# Patient Record
Sex: FEMALE | NOT HISPANIC OR LATINO | Employment: FULL TIME | URBAN - METROPOLITAN AREA
[De-identification: names, ages, dates, MRNs, and addresses within clinical notes are randomized per-mention and may not be internally consistent; named-entity substitution may affect disease eponyms.]

---

## 2017-03-22 ENCOUNTER — TELEPHONE (OUTPATIENT)
Dept: OBSTETRICS AND GYNECOLOGY | Facility: CLINIC | Age: 37
End: 2017-03-22

## 2017-03-22 ENCOUNTER — PATIENT MESSAGE (OUTPATIENT)
Dept: OBSTETRICS AND GYNECOLOGY | Facility: CLINIC | Age: 37
End: 2017-03-22

## 2017-04-24 ENCOUNTER — OFFICE VISIT (OUTPATIENT)
Dept: OBSTETRICS AND GYNECOLOGY | Facility: CLINIC | Age: 37
End: 2017-04-24
Payer: COMMERCIAL

## 2017-04-24 VITALS
BODY MASS INDEX: 35.61 KG/M2 | HEIGHT: 64 IN | SYSTOLIC BLOOD PRESSURE: 108 MMHG | WEIGHT: 208.56 LBS | DIASTOLIC BLOOD PRESSURE: 72 MMHG

## 2017-04-24 DIAGNOSIS — Z30.41 ENCOUNTER FOR SURVEILLANCE OF CONTRACEPTIVE PILLS: ICD-10-CM

## 2017-04-24 DIAGNOSIS — R10.2 ACUTE PELVIC PAIN, FEMALE: ICD-10-CM

## 2017-04-24 PROCEDURE — 99999 PR PBB SHADOW E&M-EST. PATIENT-LVL II: CPT | Mod: PBBFAC,,, | Performed by: NURSE PRACTITIONER

## 2017-04-24 PROCEDURE — 88175 CYTOPATH C/V AUTO FLUID REDO: CPT

## 2017-04-24 PROCEDURE — 1160F RVW MEDS BY RX/DR IN RCRD: CPT | Mod: S$GLB,,, | Performed by: NURSE PRACTITIONER

## 2017-04-24 PROCEDURE — 99203 OFFICE O/P NEW LOW 30 MIN: CPT | Mod: S$GLB,,, | Performed by: NURSE PRACTITIONER

## 2017-04-24 RX ORDER — LEVONORGESTREL / ETHINYL ESTRADIOL AND ETHINYL ESTRADIOL 150-30(84)
1 KIT ORAL DAILY
Qty: 91 EACH | Refills: 0 | Status: SHIPPED | OUTPATIENT
Start: 2017-04-24 | End: 2017-07-17 | Stop reason: SDUPTHER

## 2017-04-24 NOTE — PROGRESS NOTES
"CC: Well woman exam    Micaela David is a 36 y.o. female  presents for well woman exam.  LMP: Patient's last menstrual period was 2016..  No issues, problems, or complaints. Patient states pap exam 2014 , normal. OCP cycles are 3 months related to OCP.      Past Medical History:   Diagnosis Date    Asthma      History reviewed. No pertinent surgical history.  Social History     Social History    Marital status: Single     Spouse name: N/A    Number of children: N/A    Years of education: N/A     Occupational History    Not on file.     Social History Main Topics    Smoking status: Current Every Day Smoker     Types: Cigarettes    Smokeless tobacco: Not on file      Comment: 3 per day     Alcohol use 0.0 oz/week     0 Standard drinks or equivalent per week      Comment: socially     Drug use: No    Sexual activity: Yes     Partners: Male     Birth control/ protection: OCP     Other Topics Concern    Not on file     Social History Narrative     Family History   Problem Relation Age of Onset    Colon cancer Maternal Grandfather     Ovarian cancer Maternal Aunt     Diabetes Mother     Hypertension Mother     Diabetes Father     Hypertension Father     Breast cancer Neg Hx      OB History      Para Term  AB TAB SAB Ectopic Multiple Living    4 2 2  2     2          /72  Ht 5' 4" (1.626 m)  Wt 94.6 kg (208 lb 8.9 oz)  LMP 2016  BMI 35.8 kg/m2      ROS:  GENERAL: Denies weight gain or weight loss. Feeling well overall.   SKIN: Denies rash or lesions.   HEAD: Denies head injury or headache.   NODES: Denies enlarged lymph nodes.   CHEST: Denies chest pain or shortness of breath.   CARDIOVASCULAR: Denies palpitations or left sided chest pain.   ABDOMEN: No abdominal pain, constipation, diarrhea, nausea, vomiting or rectal bleeding.   URINARY: No frequency, dysuria, hematuria, or burning on urination.  REPRODUCTIVE: See HPI.   BREASTS: The patient " performs breast self-examination and denies pain, lumps, or nipple discharge.   HEMATOLOGIC: No easy bruisability or excessive bleeding.   MUSCULOSKELETAL: Denies joint pain or swelling.   NEUROLOGIC: Denies syncope or weakness.   PSYCHIATRIC: Denies depression, anxiety or mood swings.    PHYSICAL EXAM:  APPEARANCE: Obese female, in no acute distress.  AFFECT: WNL, alert and oriented x 3  SKIN: No acne or hirsutism  NECK: Neck symmetric without masses or thyromegaly  NODES: No inguinal, cervical, axillary, or femoral lymph node enlargement  CHEST: Good respiratory effect  ABDOMEN: Soft.  No tenderness or masses.  No hepatosplenomegaly.  No hernias.  BREASTS: Symmetrical, no skin changes or visible lesions.  No palpable masses, nipple discharge bilaterally.  PELVIC: Normal external genitalia without lesions.  Normal hair distribution.  Adequate perineal body, normal urethral meatus.  Vagina moist and well rugated without lesions or discharge.  Cervix pink, without lesions, discharge or tenderness.  No significant cystocele or rectocele.  Bimanual exam shows uterus to be normal size, regular, mobile and nontender.  Adnexa without masses or tenderness.    EXTREMITIES: No edema.    1. Acute pelvic pain, female  L norgest/e.estradiol-e.estrad (AMETHIA) 0.15 mg-30 mcg (84)/10 mcg (7) 3MPk   2. Encounter for surveillance of contraceptive pills  Liquid-based pap smear, screening    L norgest/e.estradiol-e.estrad (AMETHIA) 0.15 mg-30 mcg (84)/10 mcg (7) 3MPk    PLAN:  OCP refill  Pap exam  Patient was counseled today on A.C.S. Pap guidelines and recommendations for yearly pelvic exams, mammograms and monthly self breast exams; to see her PCP for other health maintenance.

## 2017-04-24 NOTE — MR AVS SNAPSHOT
[facility]  [address] [facility]  [place] [address]  Phone: [phone]  Fax: [phone]                  Micaela David   2017 4:00 PM   Office Visit    Description:  Female : 1980   Provider:  Jackie Cheng NP   Department:  [facility]           Reason for Visit     Gynecologic Exam           Diagnoses this Visit        Comments    Acute pelvic pain, female         Encounter for surveillance of contraceptive pills                To Do List           Goals (5 Years of Data)     None       These Medications        Disp Refills Start End    L norgest/e.estradiol-e.estrad (AMETHIA) 0.15 mg-30 mcg (84)/10 mcg (7) 3MPk 91 each 0 2017     Take 1 tablet by mouth once daily. - Oral    Pharmacy: [facility] [address] [place] [address] [facility] [address] AT [facility] [address] & [place] [address] Ph #: [phone]         [facility]     [facility] Nurse Care Line -  Assistance  Unless otherwise directed by your provider, please contact [facility], our nurse care line that is available for  assistance.     Registered nurses in the [facility] provide: appointment scheduling, clinical advisement, health education, and other advisory services.  Call: [phone] (toll free)               Medications           Message regarding Medications     Verify the changes and/or additions to your medication regime listed below are the same as discussed with your clinician today.  If any of these changes or additions are incorrect, please notify your healthcare provider.        CHANGE how you are taking these medications     Start Taking Instead of    L norgest/e.estradiol-e.estrad (AMETHIA) 0.15 mg-30 mcg (84)/10 mcg (7) 3MPk AMETHIA 0.15 mg-30 mcg (84)/10 mcg (7) 3MPk    Dosage:  Take 1 tablet by mouth once daily. Dosage:  TAKE 1 TABLET BY MOUTH EVERY DAY    Reason for Change:  Reorder       STOP taking these medications      "methylPREDNISolone (MEDROL DOSEPACK) 4 mg tablet Take as directed           Verify that the below list of medications is an accurate representation of the medications you are currently taking.  If none reported, the list may be blank. If incorrect, please contact your healthcare provider. Carry this list with you in case of emergency.           Current Medications     L norgest/e.estradiol-e.estrad (AMETHIA) 0.15 mg-30 mcg (84)/10 mcg (7) 3MPk Take 1 tablet by mouth once daily.           Clinical Reference Information           Your Vitals Were     BP Height Weight Last Period BMI    108/72 5' 4" (1.626 m) 94.6 kg (208 lb 8.9 oz) 11/25/2016 35.8 kg/m2      Blood Pressure          Most Recent Value    BP  108/72      Allergies as of 4/24/2017     No Known Allergies      Immunizations Administered on Date of Encounter - 4/24/2017     None      Orders Placed During Today's Visit      Normal Orders This Visit    Liquid-based pap smear, screening       Smoking Cessation     If you would like to quit smoking:   You may be eligible for free services if you are a [place] resident and started smoking cigarettes before September 1, 1988.  Call the Smoking Cessation Trust ([facility]) toll free at [phone] [phone] or [phone] [phone].   Call -800-QUIT-NOW if you do not meet the above criteria.   Contact us via email: [email or web address]   View our website for more information: [email or web address]        Language Assistance Services     ATTENTION: Language assistance services are available, free of charge. Please call [phone].      ATENCIÓN: Si habla español, tiene a pool disposición servicios gratuitos de asistencia lingüística. Llame al [phone].     CHÚ Ý: N?u b?n nói Ti?ng Vi?t, có các d?ch v? h? tr? ngôn ng? mi?n phí dành cho b?n. G?i s? [phone].         [facility] complies with applicable Federal civil rights laws and does not discriminate on the basis of race, color, national origin, age, " disability, or sex.

## 2017-07-17 DIAGNOSIS — Z30.41 ENCOUNTER FOR SURVEILLANCE OF CONTRACEPTIVE PILLS: ICD-10-CM

## 2017-07-17 DIAGNOSIS — R10.2 ACUTE PELVIC PAIN, FEMALE: ICD-10-CM

## 2017-07-17 RX ORDER — ETHINYL ESTRADIOL AND LEVONORGESTREL 150-30(84)
KIT ORAL
Qty: 28 EACH | Refills: 3 | Status: SHIPPED | OUTPATIENT
Start: 2017-07-17 | End: 2018-02-15

## 2018-02-14 ENCOUNTER — PATIENT MESSAGE (OUTPATIENT)
Dept: OBSTETRICS AND GYNECOLOGY | Facility: CLINIC | Age: 38
End: 2018-02-14

## 2018-02-15 ENCOUNTER — LAB VISIT (OUTPATIENT)
Dept: LAB | Facility: HOSPITAL | Age: 38
End: 2018-02-15
Attending: OBSTETRICS & GYNECOLOGY
Payer: COMMERCIAL

## 2018-02-15 ENCOUNTER — OFFICE VISIT (OUTPATIENT)
Dept: OBSTETRICS AND GYNECOLOGY | Facility: CLINIC | Age: 38
End: 2018-02-15
Payer: COMMERCIAL

## 2018-02-15 VITALS
WEIGHT: 207.69 LBS | DIASTOLIC BLOOD PRESSURE: 78 MMHG | SYSTOLIC BLOOD PRESSURE: 122 MMHG | BODY MASS INDEX: 35.46 KG/M2 | HEIGHT: 64 IN

## 2018-02-15 DIAGNOSIS — N93.9 ABNORMAL UTERINE BLEEDING (AUB): ICD-10-CM

## 2018-02-15 DIAGNOSIS — N93.9 ABNORMAL UTERINE BLEEDING (AUB): Primary | ICD-10-CM

## 2018-02-15 DIAGNOSIS — Z11.3 SCREEN FOR STD (SEXUALLY TRANSMITTED DISEASE): ICD-10-CM

## 2018-02-15 LAB
BASOPHILS # BLD AUTO: 0.05 K/UL
BASOPHILS NFR BLD: 0.9 %
C TRACH DNA SPEC QL NAA+PROBE: NOT DETECTED
DIFFERENTIAL METHOD: NORMAL
EOSINOPHIL # BLD AUTO: 0.1 K/UL
EOSINOPHIL NFR BLD: 2.5 %
ERYTHROCYTE [DISTWIDTH] IN BLOOD BY AUTOMATED COUNT: 12.9 %
FSH SERPL-ACNC: 8.4 MIU/ML
HCT VFR BLD AUTO: 37.9 %
HGB BLD-MCNC: 12.7 G/DL
IMM GRANULOCYTES # BLD AUTO: 0.01 K/UL
IMM GRANULOCYTES NFR BLD AUTO: 0.2 %
LYMPHOCYTES # BLD AUTO: 2.2 K/UL
LYMPHOCYTES NFR BLD: 41.1 %
MCH RBC QN AUTO: 29.1 PG
MCHC RBC AUTO-ENTMCNC: 33.5 G/DL
MCV RBC AUTO: 87 FL
MONOCYTES # BLD AUTO: 0.3 K/UL
MONOCYTES NFR BLD: 6.3 %
N GONORRHOEA DNA SPEC QL NAA+PROBE: NOT DETECTED
NEUTROPHILS # BLD AUTO: 2.6 K/UL
NEUTROPHILS NFR BLD: 49 %
NRBC BLD-RTO: 0 /100 WBC
PLATELET # BLD AUTO: 336 K/UL
PMV BLD AUTO: 10.4 FL
RBC # BLD AUTO: 4.37 M/UL
TSH SERPL DL<=0.005 MIU/L-ACNC: 0.46 UIU/ML
WBC # BLD AUTO: 5.28 K/UL

## 2018-02-15 PROCEDURE — 99213 OFFICE O/P EST LOW 20 MIN: CPT | Mod: S$GLB,,, | Performed by: OBSTETRICS & GYNECOLOGY

## 2018-02-15 PROCEDURE — 84443 ASSAY THYROID STIM HORMONE: CPT

## 2018-02-15 PROCEDURE — 36415 COLL VENOUS BLD VENIPUNCTURE: CPT | Mod: PO

## 2018-02-15 PROCEDURE — 81025 URINE PREGNANCY TEST: CPT | Mod: S$GLB,,, | Performed by: OBSTETRICS & GYNECOLOGY

## 2018-02-15 PROCEDURE — 83001 ASSAY OF GONADOTROPIN (FSH): CPT

## 2018-02-15 PROCEDURE — 85025 COMPLETE CBC W/AUTO DIFF WBC: CPT

## 2018-02-15 PROCEDURE — 99999 PR PBB SHADOW E&M-EST. PATIENT-LVL II: CPT | Mod: PBBFAC,,, | Performed by: OBSTETRICS & GYNECOLOGY

## 2018-02-15 PROCEDURE — 3008F BODY MASS INDEX DOCD: CPT | Mod: S$GLB,,, | Performed by: OBSTETRICS & GYNECOLOGY

## 2018-02-15 PROCEDURE — 87491 CHLMYD TRACH DNA AMP PROBE: CPT

## 2018-02-15 RX ORDER — NORGESTIMATE AND ETHINYL ESTRADIOL 7DAYSX3 LO
1 KIT ORAL DAILY
Qty: 30 TABLET | Refills: 3 | Status: SHIPPED | OUTPATIENT
Start: 2018-02-15 | End: 2018-05-08

## 2018-02-15 RX ORDER — MEDROXYPROGESTERONE ACETATE 10 MG/1
10 TABLET ORAL DAILY
Qty: 10 TABLET | Refills: 0 | Status: SHIPPED | OUTPATIENT
Start: 2018-02-15 | End: 2018-05-08

## 2018-02-15 NOTE — PROGRESS NOTES
Subjective:       Patient ID: Micaela David is a 37 y.o. female.    Chief Complaint:  Vaginal Bleeding      History of Present Illness  HPI  Dysfunctional Uterine Bleeding  Patient complains of irregular menses. Pt was using continuous OCP and discontinued use in 2017 to try for pregnancy with .  Last normal menses occurred in 2017.  Pt then skipped menses for the next 5 months.  Menses then restarted on 18 and was heavier than usual.  She is still bleeding today but states that flow is much lighter now. Dysmenorrhea:mild, occurring first 1-2 days of flow. Cyclic symptoms include: none. Current contraception: none. History of infertility: no. History of abnormal Pap smear: no.  Last pap NILM in 2017.       GYN & OB History  Patient's last menstrual period was 2018 (exact date).   Date of Last Pap: 2017    OB History    Para Term  AB Living   4 2 2   2 2   SAB TAB Ectopic Multiple Live Births           2      # Outcome Date GA Lbr Elbert/2nd Weight Sex Delivery Anes PTL Lv   4 AB  4w0d          3 Term    3.515 kg (7 lb 12 oz) F CS-Unspec   JEIMY   2 Term    3.572 kg (7 lb 14 oz) F CS-Unspec   JEIMY   1 AB                   Review of Systems  Review of Systems   Constitutional: Negative for activity change, appetite change, fatigue, fever and unexpected weight change.   Respiratory: Negative for shortness of breath.    Cardiovascular: Negative for chest pain, palpitations and leg swelling.   Gastrointestinal: Negative for abdominal pain, bloating, blood in stool, constipation, diarrhea, nausea and vomiting.   Genitourinary: Positive for menorrhagia, menstrual problem and vaginal bleeding. Negative for dyspareunia, dysuria, flank pain, frequency, genital sores, hematuria, pelvic pain, vaginal discharge, vaginal pain, dysmenorrhea, urinary incontinence and vaginal odor.   Musculoskeletal: Negative for back pain.   Neurological: Negative for syncope and headaches.            Objective:    Physical Exam:   Constitutional: She is oriented to person, place, and time. She appears well-developed and well-nourished. No distress.       Cardiovascular: Normal rate, regular rhythm and normal heart sounds.     Pulmonary/Chest: Effort normal and breath sounds normal.        Abdominal: Soft. Bowel sounds are normal. She exhibits no distension. There is no tenderness.     Genitourinary: Uterus normal. Pelvic exam was performed with patient supine. There is no rash, tenderness, lesion or injury on the right labia. There is no rash, tenderness, lesion or injury on the left labia. Uterus is not deviated, not enlarged and not tender. Cervix is normal. Right adnexum displays no mass, no tenderness and no fullness. Left adnexum displays no mass, no tenderness and no fullness. There is bleeding (minimal amount of old blood in vault; no active bleeding) in the vagina. No erythema or tenderness in the vagina. No foreign body in the vagina. No signs of injury around the vagina. No vaginal discharge found. Cervix exhibits no motion tenderness, no discharge and no friability.   Genitourinary Comments: UPT today Negative           Musculoskeletal: Normal range of motion and moves all extremeties. She exhibits no edema or tenderness.       Neurological: She is alert and oriented to person, place, and time.    Skin: Skin is warm and dry.    Psychiatric: She has a normal mood and affect. Her behavior is normal. Thought content normal.          Assessment:        1. Abnormal uterine bleeding (AUB)    2. Screen for STD (sexually transmitted disease)              Plan:      Abnormal uterine bleeding (AUB)  -     CBC auto differential; Future; Expected date: 02/15/2018  -     TSH; Future; Expected date: 02/15/2018  -     Follicle stimulating hormone; Future; Expected date: 02/15/2018  -     POCT urine pregnancy  -     medroxyPROGESTERone (PROVERA) 10 MG tablet; Take 1 tablet (10 mg total) by mouth once daily.   Dispense: 10 tablet; Refill: 0  -     norgestimate-ethinyl estradiol (ORTHO TRI-CYCLEN LO) 0.18/0.215/0.25 mg-25 mcg tablet; Take 1 tablet by mouth once daily.  Dispense: 30 tablet; Refill: 3  -     Pt was counseled on treatment options, including associated risks and benefits of each.  Pt voiced understanding and desires to proceed with course of Provera followed by OCP x 3-6 months.  Medication dosing, side-effects, risks, benefits, and alternatives were discussed.  Medical history was reviewed and pt is a candidate for OCP use.    Screen for STD (sexually transmitted disease)  -     C. trachomatis/N. gonorrhoeae by AMP DNA Cervix      Follow-up if symptoms worsen or fail to improve.

## 2018-05-08 ENCOUNTER — OFFICE VISIT (OUTPATIENT)
Dept: OBSTETRICS AND GYNECOLOGY | Facility: CLINIC | Age: 38
End: 2018-05-08
Payer: COMMERCIAL

## 2018-05-08 VITALS
BODY MASS INDEX: 35.46 KG/M2 | SYSTOLIC BLOOD PRESSURE: 116 MMHG | DIASTOLIC BLOOD PRESSURE: 74 MMHG | WEIGHT: 207.69 LBS | HEIGHT: 64 IN

## 2018-05-08 DIAGNOSIS — Z32.01 POSITIVE PREGNANCY TEST: Primary | ICD-10-CM

## 2018-05-08 DIAGNOSIS — O34.219 HISTORY OF CESAREAN DELIVERY, CURRENTLY PREGNANT: ICD-10-CM

## 2018-05-08 DIAGNOSIS — O09.521 AMA (ADVANCED MATERNAL AGE) MULTIGRAVIDA 35+, FIRST TRIMESTER: ICD-10-CM

## 2018-05-08 PROCEDURE — 87086 URINE CULTURE/COLONY COUNT: CPT

## 2018-05-08 PROCEDURE — 99213 OFFICE O/P EST LOW 20 MIN: CPT | Mod: S$GLB,,, | Performed by: OBSTETRICS & GYNECOLOGY

## 2018-05-08 PROCEDURE — 81025 URINE PREGNANCY TEST: CPT | Mod: S$GLB,,, | Performed by: OBSTETRICS & GYNECOLOGY

## 2018-05-08 PROCEDURE — 99999 PR PBB SHADOW E&M-EST. PATIENT-LVL II: CPT | Mod: PBBFAC,,, | Performed by: OBSTETRICS & GYNECOLOGY

## 2018-05-08 NOTE — PROGRESS NOTES
CHIEF COMPLAINT:   Patient presents with      Possible Pregnancy        HISTORY OF PRESENT ILLNESS  Micaela David 37 y.o.  presents for pregnancy risk assessment.   The patient has no complaints today.  No nausea or vomiting. No bleeding or pain.  Pregnancy was planned and is desired.   is supportive of pregnancy and was present for today's visit.  Sister was also present.  Lives at home with , sister, and 2 children.  Has pets at home: birds and fish.  Works in a [employer]ing.  Denies domestic abuse.  Denies chemical/pesticide/radiation exposure.  OB history:  1.  C/S at term (fetal distress) with no complications  2.  C/S at term (scheduled repeat) with no complications  3.  SAB  4.  SAB    LMP: Patient's last menstrual period was 03/10/2018.  EDC: Estimated Date of Delivery: 12/15/18  EGA: 8w3d       Health Maintenance   Topic Date Due    Lipid Panel  1980    TETANUS VACCINE  2000    Pneumococcal PPSV23 (Medium Risk) (1) 2000    Influenza Vaccine  2018    Pap Smear with HPV Cotest  2020       Past Medical History:   Diagnosis Date    Asthma        History reviewed. No pertinent surgical history.    Family History   Problem Relation Age of Onset    Colon cancer Maternal Grandfather     Ovarian cancer Maternal Aunt     Diabetes Mother     Hypertension Mother     Diabetes Father     Hypertension Father     Breast cancer Neg Hx        Social History     Social History    Marital status: Single     Spouse name: N/A    Number of children: N/A    Years of education: N/A     Social History Main Topics    Smoking status: Light Tobacco Smoker     Types: Vaping with nicotine    Smokeless tobacco: Never Used      Comment: 3 per day     Alcohol use 0.0 oz/week      Comment: socially     Drug use: No    Sexual activity: Yes     Partners: Male     Birth control/ protection: None     Other Topics Concern    None     Social History  Narrative    None       Current Outpatient Prescriptions   Medication Sig Dispense Refill    -IRON-FOLATE 1-DSS-DHA ORAL Take by mouth.       No current facility-administered medications for this visit.        Review of patient's allergies indicates:  No Known Allergies      PHYSICAL EXAM   Vitals:    05/08/18 1506   BP: 116/74        PAIN SCALE: 0/10 None    PHYSICAL EXAM    ROS:  GENERAL: No fever, chills, fatigability or weight loss.  CV: Denies chest pain  PULM: Denies shortness of breath or wheezing.  ABDOMEN: Appetite fine. No weight loss. Denies diarrhea, abdominal pain, hematemesis or blood in stool.  URINARY: No flank pain, dysuria or hematuria.  REPRODUCTIVE: No abnormal vaginal bleeding.       PE:   APPEARANCE: Well nourished, well developed, in no acute distress  CHEST: Clear to auscultation bilaterally  CV: Regular rate and rhythm  BREASTS: Symmetrical, no skin changes or visible lesions. No palpable masses, nipple discharge or adenopathy bilaterally.  ABDOMEN: Soft. No tenderness or masses. No hepatosplenomegaly. No hernias  PELVIC: Deferred    UPT +    A/P:  Positive pregnancy test  -      Patient was counseled today on A.C.S. Pap guidelines and recommendations for yearly pelvic exams, mammograms and monthly self breast exams; to see her PCP for other health maintenance and pregnancy.   -      Patient's medications and medical history reviewed with patient and implications in pregnancy.   -      Pregnancy course discussed and 'AtoZ' book given. Patient was counseled on proper weight gain based on the [place] [employer]'s recommendations based on her pre-pregnancy weight. Discussed foods to avoid in pregnancy (i.e. sushi, fish that are high in mercury, deli meat, and unpasteurized cheeses). Discussed prenatal vitamin options (i.e. stool softener, DHA). Discussed potential medical problems in pregnancy.  -     Discussed risk of Toxoplasmosis transmission from pets and reviewed risk reduction  techniques.  -     Pt was counseled on exercise in pregnancy and weight gain recommendations.  -     Pt was counseled on travel recommendations and on risks of Zika virus exposure.  Current CDC Zika advisories and prevention techniques were reviewed with pt.  Pt denies any recent international travel and does not plan travel during pregnancy.  Pt reports that partner does not plan travel either.  -     Oriented to practice including CNM collaboration.   -     Follow-up initial OB, with labs and u/s.     AMA (advanced maternal age) multigravida 35+, first trimester  -     CBC auto differential; Future; Expected date: 2018  -     Hepatitis panel, acute; Future; Expected date: 2018  -     HIV-1 and HIV-2 antibodies; Future; Expected date: 2018  -     POCT urine pregnancy  -     RPR; Future; Expected date: 2018  -     Rubella antibody, IgG; Future; Expected date: 2018  -     Type & Screen; Future; Expected date: 2018  -     Hemoglobin A1c; Future; Expected date: 2018  -     Urine culture  -     US OB/GYN Procedure (Viewpoint); Future  -     Discused increased risks with AMA status.  -     Chromosomal abnormality risk discussed and available testing offered.     History of  delivery, currently pregnant  -     Pt was counseled on delivery options ( vs Repeat C/S) along with associated risks and benefits.  In particular, risk of uterine rupture with  attempt was reviewed.  Group policy against elective induction or use of pitocin/cytotec in this scenario was discussed.  Pt voiced understanding and desires to proceed with repeat C/S.        Follow-up in about 1 week (around 5/15/2018) for Initial OB with CNM.

## 2018-05-08 NOTE — PROGRESS NOTES
Subjective:       Patient ID: Micaela David is a 37 y.o. female.    Chief Complaint:  No chief complaint on file.      History of Present Illness  HPI  {OBG HPI BLOCKS:58232}    GYN & OB History  No LMP recorded.   Date of Last Pap: 2017    OB History    Para Term  AB Living   4 2 2   2 2   SAB TAB Ectopic Multiple Live Births           2      # Outcome Date GA Lbr Elbert/2nd Weight Sex Delivery Anes PTL Lv   4 AB  4w0d          3 Term    3.515 kg (7 lb 12 oz) F CS-Unspec   JEIMY   2 Term    3.572 kg (7 lb 14 oz) F CS-Unspec   JEIMY   1 AB                   Review of Systems  Review of Systems        Objective:    Physical Exam       Assessment:        1. Abnormal uterine bleeding (AUB)              Plan:      Abnormal uterine bleeding (AUB)      Follow-up in about 6 months (around 2018).

## 2018-05-10 LAB — BACTERIA UR CULT: NORMAL

## 2018-05-22 ENCOUNTER — PROCEDURE VISIT (OUTPATIENT)
Dept: OBSTETRICS AND GYNECOLOGY | Facility: CLINIC | Age: 38
End: 2018-05-22
Payer: COMMERCIAL

## 2018-05-22 ENCOUNTER — INITIAL PRENATAL (OUTPATIENT)
Dept: OBSTETRICS AND GYNECOLOGY | Facility: CLINIC | Age: 38
End: 2018-05-22
Payer: COMMERCIAL

## 2018-05-22 VITALS — BODY MASS INDEX: 35.19 KG/M2 | SYSTOLIC BLOOD PRESSURE: 108 MMHG | WEIGHT: 205 LBS | DIASTOLIC BLOOD PRESSURE: 68 MMHG

## 2018-05-22 DIAGNOSIS — Z98.891 PREVIOUS CESAREAN SECTION: ICD-10-CM

## 2018-05-22 DIAGNOSIS — Z34.91 NORMAL PREGNANCY IN FIRST TRIMESTER: Primary | ICD-10-CM

## 2018-05-22 DIAGNOSIS — O09.521 AMA (ADVANCED MATERNAL AGE) MULTIGRAVIDA 35+, FIRST TRIMESTER: ICD-10-CM

## 2018-05-22 PROCEDURE — 76817 TRANSVAGINAL US OBSTETRIC: CPT | Mod: S$GLB,,, | Performed by: OBSTETRICS & GYNECOLOGY

## 2018-05-22 PROCEDURE — 99999 PR PBB SHADOW E&M-EST. PATIENT-LVL II: CPT | Mod: PBBFAC,,, | Performed by: ADVANCED PRACTICE MIDWIFE

## 2018-05-22 PROCEDURE — 0500F INITIAL PRENATAL CARE VISIT: CPT | Mod: S$GLB,,, | Performed by: ADVANCED PRACTICE MIDWIFE

## 2018-05-22 RX ORDER — PROMETHAZINE HYDROCHLORIDE 25 MG/1
25 TABLET ORAL EVERY 4 HOURS
Qty: 30 TABLET | Refills: 1 | Status: SHIPPED | OUTPATIENT
Start: 2018-05-22 | End: 2018-07-26

## 2018-05-22 NOTE — PROGRESS NOTES
NOB s=d  Prior c section x2 will request records when patient provides doctors or hospital names  Weight goal 25 pounds set  zika precautions  Declines genetic testing  RX prenatal vitamins, phenergan

## 2018-06-18 ENCOUNTER — LAB VISIT (OUTPATIENT)
Dept: LAB | Facility: HOSPITAL | Age: 38
End: 2018-06-18
Attending: OBSTETRICS & GYNECOLOGY
Payer: COMMERCIAL

## 2018-06-18 ENCOUNTER — ROUTINE PRENATAL (OUTPATIENT)
Dept: OBSTETRICS AND GYNECOLOGY | Facility: CLINIC | Age: 38
End: 2018-06-18
Payer: COMMERCIAL

## 2018-06-18 VITALS
SYSTOLIC BLOOD PRESSURE: 112 MMHG | BODY MASS INDEX: 36.23 KG/M2 | WEIGHT: 211.06 LBS | DIASTOLIC BLOOD PRESSURE: 70 MMHG

## 2018-06-18 DIAGNOSIS — O09.521 AMA (ADVANCED MATERNAL AGE) MULTIGRAVIDA 35+, FIRST TRIMESTER: Primary | ICD-10-CM

## 2018-06-18 DIAGNOSIS — O09.521 AMA (ADVANCED MATERNAL AGE) MULTIGRAVIDA 35+, FIRST TRIMESTER: ICD-10-CM

## 2018-06-18 DIAGNOSIS — O34.219 HISTORY OF CESAREAN DELIVERY, CURRENTLY PREGNANT: ICD-10-CM

## 2018-06-18 LAB
BASOPHILS # BLD AUTO: 0.04 K/UL
BASOPHILS NFR BLD: 0.5 %
DIFFERENTIAL METHOD: NORMAL
EOSINOPHIL # BLD AUTO: 0.2 K/UL
EOSINOPHIL NFR BLD: 2.8 %
ERYTHROCYTE [DISTWIDTH] IN BLOOD BY AUTOMATED COUNT: 13.7 %
ESTIMATED AVG GLUCOSE: 111 MG/DL
HBA1C MFR BLD HPLC: 5.5 %
HCT VFR BLD AUTO: 39.1 %
HGB BLD-MCNC: 13 G/DL
IMM GRANULOCYTES # BLD AUTO: 0.02 K/UL
IMM GRANULOCYTES NFR BLD AUTO: 0.3 %
LYMPHOCYTES # BLD AUTO: 1.7 K/UL
LYMPHOCYTES NFR BLD: 21.1 %
MCH RBC QN AUTO: 28.6 PG
MCHC RBC AUTO-ENTMCNC: 33.2 G/DL
MCV RBC AUTO: 86 FL
MONOCYTES # BLD AUTO: 0.5 K/UL
MONOCYTES NFR BLD: 5.6 %
NEUTROPHILS # BLD AUTO: 5.6 K/UL
NEUTROPHILS NFR BLD: 69.7 %
NRBC BLD-RTO: 0 /100 WBC
PLATELET # BLD AUTO: 319 K/UL
PMV BLD AUTO: 10.8 FL
RBC # BLD AUTO: 4.54 M/UL
WBC # BLD AUTO: 7.97 K/UL

## 2018-06-18 PROCEDURE — 0502F SUBSEQUENT PRENATAL CARE: CPT | Mod: S$GLB,,, | Performed by: ADVANCED PRACTICE MIDWIFE

## 2018-06-18 PROCEDURE — 36415 COLL VENOUS BLD VENIPUNCTURE: CPT | Mod: PO

## 2018-06-18 PROCEDURE — 85025 COMPLETE CBC W/AUTO DIFF WBC: CPT

## 2018-06-18 PROCEDURE — 86592 SYPHILIS TEST NON-TREP QUAL: CPT

## 2018-06-18 PROCEDURE — 83036 HEMOGLOBIN GLYCOSYLATED A1C: CPT

## 2018-06-18 PROCEDURE — 99999 PR PBB SHADOW E&M-EST. PATIENT-LVL II: CPT | Mod: PBBFAC,,, | Performed by: ADVANCED PRACTICE MIDWIFE

## 2018-06-18 PROCEDURE — 87491 CHLMYD TRACH DNA AMP PROBE: CPT

## 2018-06-18 PROCEDURE — 80074 ACUTE HEPATITIS PANEL: CPT

## 2018-06-18 PROCEDURE — 86703 HIV-1/HIV-2 1 RESULT ANTBDY: CPT

## 2018-06-18 PROCEDURE — 86762 RUBELLA ANTIBODY: CPT

## 2018-06-18 PROCEDURE — 86850 RBC ANTIBODY SCREEN: CPT

## 2018-06-18 NOTE — PROGRESS NOTES
Doing well, no genetic screening. Discussed hx of c/s, desires repeat declines TOLAC. Labs today, culture through urine. +FHTs pt was excited. al

## 2018-06-19 LAB
ABO + RH BLD: NORMAL
BLD GP AB SCN CELLS X3 SERPL QL: NORMAL
C TRACH DNA SPEC QL NAA+PROBE: NOT DETECTED
HAV IGM SERPL QL IA: NEGATIVE
HBV CORE IGM SERPL QL IA: NEGATIVE
HBV SURFACE AG SERPL QL IA: NEGATIVE
HCV AB SERPL QL IA: NEGATIVE
HIV 1+2 AB+HIV1 P24 AG SERPL QL IA: NEGATIVE
N GONORRHOEA DNA SPEC QL NAA+PROBE: NOT DETECTED
RPR SER QL: NORMAL
RUBV IGG SER-ACNC: 33.4 IU/ML
RUBV IGG SER-IMP: REACTIVE

## 2018-07-26 ENCOUNTER — ROUTINE PRENATAL (OUTPATIENT)
Dept: OBSTETRICS AND GYNECOLOGY | Facility: CLINIC | Age: 38
End: 2018-07-26
Payer: COMMERCIAL

## 2018-07-26 VITALS
SYSTOLIC BLOOD PRESSURE: 102 MMHG | BODY MASS INDEX: 36.18 KG/M2 | DIASTOLIC BLOOD PRESSURE: 68 MMHG | WEIGHT: 210.75 LBS

## 2018-07-26 DIAGNOSIS — Z36.3 ENCOUNTER FOR ROUTINE SCREENING FOR MALFORMATION USING ULTRASONICS: ICD-10-CM

## 2018-07-26 DIAGNOSIS — Z34.82 ENCOUNTER FOR SUPERVISION OF OTHER NORMAL PREGNANCY IN SECOND TRIMESTER: Primary | ICD-10-CM

## 2018-07-26 PROCEDURE — 99999 PR PBB SHADOW E&M-EST. PATIENT-LVL II: CPT | Mod: PBBFAC,,, | Performed by: ADVANCED PRACTICE MIDWIFE

## 2018-07-26 PROCEDURE — 0502F SUBSEQUENT PRENATAL CARE: CPT | Mod: S$GLB,,, | Performed by: ADVANCED PRACTICE MIDWIFE

## 2018-07-26 NOTE — PROGRESS NOTES
Doing well  Disappointed, thought anatomy u/s was today. Explained 20 weeks  Discussed quad screen, declines    Coffective counseling sheet Fall In Love discussed with mother. Reinforced immediate skin to skin, the magic first hour, importance of the first feeding and delaying routine procedures. Encouraged mother to download Coffective mobile willie if she has not already done so. Mother verbalizes understanding.

## 2018-08-23 ENCOUNTER — PATIENT MESSAGE (OUTPATIENT)
Dept: OBSTETRICS AND GYNECOLOGY | Facility: CLINIC | Age: 38
End: 2018-08-23

## 2018-08-23 ENCOUNTER — PROCEDURE VISIT (OUTPATIENT)
Dept: OBSTETRICS AND GYNECOLOGY | Facility: CLINIC | Age: 38
End: 2018-08-23
Payer: COMMERCIAL

## 2018-08-23 ENCOUNTER — ROUTINE PRENATAL (OUTPATIENT)
Dept: OBSTETRICS AND GYNECOLOGY | Facility: CLINIC | Age: 38
End: 2018-08-23
Payer: COMMERCIAL

## 2018-08-23 VITALS — WEIGHT: 210.31 LBS | SYSTOLIC BLOOD PRESSURE: 134 MMHG | BODY MASS INDEX: 36.1 KG/M2 | DIASTOLIC BLOOD PRESSURE: 68 MMHG

## 2018-08-23 DIAGNOSIS — Z34.82 ENCOUNTER FOR SUPERVISION OF OTHER NORMAL PREGNANCY IN SECOND TRIMESTER: Primary | ICD-10-CM

## 2018-08-23 DIAGNOSIS — Z36.3 ENCOUNTER FOR ROUTINE SCREENING FOR MALFORMATION USING ULTRASONICS: ICD-10-CM

## 2018-08-23 PROCEDURE — 99999 PR PBB SHADOW E&M-EST. PATIENT-LVL II: CPT | Mod: PBBFAC,,, | Performed by: ADVANCED PRACTICE MIDWIFE

## 2018-08-23 PROCEDURE — 76805 OB US >/= 14 WKS SNGL FETUS: CPT | Mod: S$GLB,,, | Performed by: OBSTETRICS & GYNECOLOGY

## 2018-08-23 PROCEDURE — 0502F SUBSEQUENT PRENATAL CARE: CPT | Mod: S$GLB,,, | Performed by: ADVANCED PRACTICE MIDWIFE

## 2018-08-23 NOTE — PROGRESS NOTES
Doing well  Anatomy scan today. Suboptimal spine and heart. Repeat next visit  Questions answered    Coffective counseling sheet Learn Your Baby discussed with mother. Instructed regarding feeding cues and methods to calm baby. Encouraged mother to download Coffective mobile willie if she has not already done so.  Mother verbalized understanding.

## 2018-09-24 ENCOUNTER — TELEPHONE (OUTPATIENT)
Dept: OBSTETRICS AND GYNECOLOGY | Facility: CLINIC | Age: 38
End: 2018-09-24

## 2018-09-24 ENCOUNTER — PATIENT MESSAGE (OUTPATIENT)
Dept: OBSTETRICS AND GYNECOLOGY | Facility: CLINIC | Age: 38
End: 2018-09-24

## 2018-09-24 NOTE — TELEPHONE ENCOUNTER
Appt r/s for 10/01/18 at 11am at [facility], per pt request.  Pt voiced understanding.  NICKO CHRISTIEN

## 2018-09-24 NOTE — TELEPHONE ENCOUNTER
----- Message from Adri Barraza sent at 9/24/2018  7:50 AM CDT -----  Contact: Patient  Patient needs to reschedule her appts today, please call her back at [phone]. Thank you

## 2018-10-01 ENCOUNTER — ROUTINE PRENATAL (OUTPATIENT)
Dept: OBSTETRICS AND GYNECOLOGY | Facility: CLINIC | Age: 38
End: 2018-10-01
Payer: COMMERCIAL

## 2018-10-01 ENCOUNTER — PROCEDURE VISIT (OUTPATIENT)
Dept: OBSTETRICS AND GYNECOLOGY | Facility: CLINIC | Age: 38
End: 2018-10-01
Payer: COMMERCIAL

## 2018-10-01 VITALS — SYSTOLIC BLOOD PRESSURE: 120 MMHG | WEIGHT: 214.5 LBS | BODY MASS INDEX: 36.82 KG/M2 | DIASTOLIC BLOOD PRESSURE: 70 MMHG

## 2018-10-01 DIAGNOSIS — Z3A.28 28 WEEKS GESTATION OF PREGNANCY: Primary | ICD-10-CM

## 2018-10-01 PROCEDURE — 0502F SUBSEQUENT PRENATAL CARE: CPT | Mod: S$GLB,,, | Performed by: ADVANCED PRACTICE MIDWIFE

## 2018-10-01 PROCEDURE — 76805 OB US >/= 14 WKS SNGL FETUS: CPT | Mod: S$GLB,,, | Performed by: OBSTETRICS & GYNECOLOGY

## 2018-10-01 PROCEDURE — 99999 PR PBB SHADOW E&M-EST. PATIENT-LVL I: CPT | Mod: PBBFAC,,, | Performed by: ADVANCED PRACTICE MIDWIFE

## 2018-10-01 NOTE — PROGRESS NOTES
Doing well  28 week labs next visit  Anatomy scan complete  PTL talk    Coffective counseling sheet Fall In Love discussed with mother. Reinforced immediate skin to skin, the magic first hour, importance of the first feeding and delaying routine procedures. Encouraged mother to download Coffective mobile willie if she has not already done so. Mother verbalizes understanding.

## 2018-10-22 ENCOUNTER — LAB VISIT (OUTPATIENT)
Dept: LAB | Facility: HOSPITAL | Age: 38
End: 2018-10-22
Attending: ADVANCED PRACTICE MIDWIFE
Payer: COMMERCIAL

## 2018-10-22 ENCOUNTER — ROUTINE PRENATAL (OUTPATIENT)
Dept: OBSTETRICS AND GYNECOLOGY | Facility: CLINIC | Age: 38
End: 2018-10-22
Payer: COMMERCIAL

## 2018-10-22 VITALS
SYSTOLIC BLOOD PRESSURE: 110 MMHG | BODY MASS INDEX: 38.33 KG/M2 | DIASTOLIC BLOOD PRESSURE: 60 MMHG | WEIGHT: 223.31 LBS

## 2018-10-22 DIAGNOSIS — Z3A.28 28 WEEKS GESTATION OF PREGNANCY: ICD-10-CM

## 2018-10-22 DIAGNOSIS — Z34.83 ENCOUNTER FOR SUPERVISION OF OTHER NORMAL PREGNANCY IN THIRD TRIMESTER: Primary | ICD-10-CM

## 2018-10-22 LAB
BASOPHILS # BLD AUTO: 0.04 K/UL
BASOPHILS NFR BLD: 0.4 %
DIFFERENTIAL METHOD: ABNORMAL
EOSINOPHIL # BLD AUTO: 0.2 K/UL
EOSINOPHIL NFR BLD: 1.9 %
ERYTHROCYTE [DISTWIDTH] IN BLOOD BY AUTOMATED COUNT: 13.4 %
GLUCOSE SERPL-MCNC: 167 MG/DL
HCT VFR BLD AUTO: 35.5 %
HGB BLD-MCNC: 11.7 G/DL
IMM GRANULOCYTES # BLD AUTO: 0.04 K/UL
IMM GRANULOCYTES NFR BLD AUTO: 0.4 %
LYMPHOCYTES # BLD AUTO: 1.4 K/UL
LYMPHOCYTES NFR BLD: 15.1 %
MCH RBC QN AUTO: 28.5 PG
MCHC RBC AUTO-ENTMCNC: 33 G/DL
MCV RBC AUTO: 87 FL
MONOCYTES # BLD AUTO: 0.5 K/UL
MONOCYTES NFR BLD: 5.8 %
NEUTROPHILS # BLD AUTO: 7 K/UL
NEUTROPHILS NFR BLD: 76.4 %
NRBC BLD-RTO: 0 /100 WBC
PLATELET # BLD AUTO: 267 K/UL
PMV BLD AUTO: 11.4 FL
RBC # BLD AUTO: 4.1 M/UL
WBC # BLD AUTO: 9.16 K/UL

## 2018-10-22 PROCEDURE — 36415 COLL VENOUS BLD VENIPUNCTURE: CPT | Mod: PO

## 2018-10-22 PROCEDURE — 82950 GLUCOSE TEST: CPT

## 2018-10-22 PROCEDURE — 99999 PR PBB SHADOW E&M-EST. PATIENT-LVL II: CPT | Mod: PBBFAC,,, | Performed by: ADVANCED PRACTICE MIDWIFE

## 2018-10-22 PROCEDURE — 0502F SUBSEQUENT PRENATAL CARE: CPT | Mod: S$GLB,,, | Performed by: ADVANCED PRACTICE MIDWIFE

## 2018-10-22 PROCEDURE — 86703 HIV-1/HIV-2 1 RESULT ANTBDY: CPT

## 2018-10-22 PROCEDURE — 90471 IMMUNIZATION ADMIN: CPT | Mod: S$GLB,,, | Performed by: OBSTETRICS & GYNECOLOGY

## 2018-10-22 PROCEDURE — 90686 IIV4 VACC NO PRSV 0.5 ML IM: CPT | Mod: S$GLB,,, | Performed by: OBSTETRICS & GYNECOLOGY

## 2018-10-22 PROCEDURE — 85025 COMPLETE CBC W/AUTO DIFF WBC: CPT

## 2018-10-22 PROCEDURE — 86592 SYPHILIS TEST NON-TREP QUAL: CPT

## 2018-10-22 NOTE — PROGRESS NOTES
Doing well  28 week labs today  Flu shot given  TDAP next visit  PTL talk    Coffective counseling sheet Keep Baby Close discussed with mother. Reinforced rooming in practices, continued skin to skin, and quiet hours as requested by mother.  Encouraged mother to download Coffective mobile willie if she has not already done so. Mother verbalizes understanding.

## 2018-10-23 ENCOUNTER — PATIENT MESSAGE (OUTPATIENT)
Dept: OBSTETRICS AND GYNECOLOGY | Facility: CLINIC | Age: 38
End: 2018-10-23

## 2018-10-23 ENCOUNTER — TELEPHONE (OUTPATIENT)
Dept: OBSTETRICS AND GYNECOLOGY | Facility: HOSPITAL | Age: 38
End: 2018-10-23

## 2018-10-23 DIAGNOSIS — R73.09 ELEVATED GLUCOSE: Primary | ICD-10-CM

## 2018-10-23 LAB
HIV 1+2 AB+HIV1 P24 AG SERPL QL IA: NEGATIVE
RPR SER QL: NORMAL

## 2018-11-14 ENCOUNTER — ROUTINE PRENATAL (OUTPATIENT)
Dept: OBSTETRICS AND GYNECOLOGY | Facility: CLINIC | Age: 38
End: 2018-11-14
Payer: COMMERCIAL

## 2018-11-14 ENCOUNTER — LAB VISIT (OUTPATIENT)
Dept: LAB | Facility: HOSPITAL | Age: 38
End: 2018-11-14
Attending: ADVANCED PRACTICE MIDWIFE
Payer: COMMERCIAL

## 2018-11-14 VITALS — WEIGHT: 224.88 LBS | DIASTOLIC BLOOD PRESSURE: 76 MMHG | BODY MASS INDEX: 38.6 KG/M2 | SYSTOLIC BLOOD PRESSURE: 122 MMHG

## 2018-11-14 DIAGNOSIS — O09.523 AMA (ADVANCED MATERNAL AGE) MULTIGRAVIDA 35+, THIRD TRIMESTER: Primary | ICD-10-CM

## 2018-11-14 DIAGNOSIS — Z23 NEED FOR TDAP VACCINATION: ICD-10-CM

## 2018-11-14 DIAGNOSIS — R73.09 ELEVATED GLUCOSE: ICD-10-CM

## 2018-11-14 DIAGNOSIS — O34.219 HISTORY OF CESAREAN DELIVERY, CURRENTLY PREGNANT: ICD-10-CM

## 2018-11-14 LAB
GLUCOSE SERPL-MCNC: 181 MG/DL
GLUCOSE SERPL-MCNC: 211 MG/DL
GLUCOSE SERPL-MCNC: 69 MG/DL
GLUCOSE SERPL-MCNC: 83 MG/DL

## 2018-11-14 PROCEDURE — 90715 TDAP VACCINE 7 YRS/> IM: CPT | Mod: S$GLB,,, | Performed by: OBSTETRICS & GYNECOLOGY

## 2018-11-14 PROCEDURE — 82952 GTT-ADDED SAMPLES: CPT

## 2018-11-14 PROCEDURE — 99999 PR PBB SHADOW E&M-EST. PATIENT-LVL II: CPT | Mod: PBBFAC,,, | Performed by: OBSTETRICS & GYNECOLOGY

## 2018-11-14 PROCEDURE — 0502F SUBSEQUENT PRENATAL CARE: CPT | Mod: S$GLB,,, | Performed by: OBSTETRICS & GYNECOLOGY

## 2018-11-14 PROCEDURE — 90471 IMMUNIZATION ADMIN: CPT | Mod: S$GLB,,, | Performed by: OBSTETRICS & GYNECOLOGY

## 2018-11-14 PROCEDURE — 82951 GLUCOSE TOLERANCE TEST (GTT): CPT

## 2018-11-14 PROCEDURE — 36415 COLL VENOUS BLD VENIPUNCTURE: CPT

## 2018-11-14 NOTE — PROGRESS NOTES
2 pt identifiers verified, pt notified to wait 15 minutes after injection in lobby, 0.5ml of TDAP administered IM to pt's left deltoid. Pt tolerated well.

## 2018-11-14 NOTE — PROGRESS NOTES
Pt doing well today.  No complaints.  Desires to schedule C/S.  Pt taking 3hr GTT today.  Pt counseled on Tdap.  Desires to get it today.  Repeat C/S scheduled for 1/2/19 @ 0700.  Begin weekly BPP with next visit (AMA).  Labor precautions and kick counts.

## 2018-11-15 ENCOUNTER — TELEPHONE (OUTPATIENT)
Dept: OBSTETRICS AND GYNECOLOGY | Facility: CLINIC | Age: 38
End: 2018-11-15

## 2018-11-15 ENCOUNTER — PATIENT MESSAGE (OUTPATIENT)
Dept: OBSTETRICS AND GYNECOLOGY | Facility: CLINIC | Age: 38
End: 2018-11-15

## 2018-11-15 DIAGNOSIS — O24.410 DIET CONTROLLED GESTATIONAL DIABETES MELLITUS (GDM) IN THIRD TRIMESTER: Primary | ICD-10-CM

## 2018-11-15 NOTE — TELEPHONE ENCOUNTER
Spoke with patient; discussed results and need for diabetes education and to expect a call from that department for an appointment.  She is advised to call us back if she doesn't hear from them.  Patient verbalized understanding of advice.

## 2018-11-15 NOTE — TELEPHONE ENCOUNTER
Please inform pt she did not pass her diabetes screen and make her an appt with diabetes education

## 2018-11-20 ENCOUNTER — CLINICAL SUPPORT (OUTPATIENT)
Dept: DIABETES | Facility: CLINIC | Age: 38
End: 2018-11-20
Payer: COMMERCIAL

## 2018-11-20 VITALS — BODY MASS INDEX: 38.35 KG/M2 | WEIGHT: 224.63 LBS | HEIGHT: 64 IN

## 2018-11-20 DIAGNOSIS — O24.410 DIET CONTROLLED GESTATIONAL DIABETES MELLITUS (GDM) IN THIRD TRIMESTER: Primary | ICD-10-CM

## 2018-11-20 PROCEDURE — 99999 PR PBB SHADOW E&M-EST. PATIENT-LVL II: CPT | Mod: PBBFAC,,, | Performed by: DIETITIAN, REGISTERED

## 2018-11-20 PROCEDURE — G0108 DIAB MANAGE TRN  PER INDIV: HCPCS | Mod: S$GLB,,, | Performed by: DIETITIAN, REGISTERED

## 2018-11-20 RX ORDER — LANCETS
1 EACH MISCELLANEOUS 4 TIMES DAILY
Qty: 100 EACH | Refills: 11 | Status: SHIPPED | OUTPATIENT
Start: 2018-11-20 | End: 2019-01-31

## 2018-11-20 RX ORDER — INSULIN PUMP SYRINGE, 3 ML
EACH MISCELLANEOUS
Qty: 1 EACH | Refills: 0 | Status: SHIPPED | OUTPATIENT
Start: 2018-11-20 | End: 2019-01-31

## 2018-11-20 NOTE — LETTER
November 20, 2018        Maria Teresa Tyler, CN  [address] [facility] [address]             [facility]  [address] [place] [address]  Phone: [phone]  Fax: [phone]   Patient: Micaela David   MR Number: [id number]   YOB: 1980   Date of Visit: 11/20/2018       Dear Dr. Tyler:    Thank you for referring Micaela David to me for evaluation. Below are the relevant portions of my assessment and plan of care.       If you have questions, please do not hesitate to call me. I look forward to following Micaela along with you.    Sincerely,      Marce Rutherford, ISAMAR           CC  No Recipients

## 2018-11-20 NOTE — PROGRESS NOTES
"Diabetes Education  Author: Marce Rutherford, ISAMAR  Date: 11/20/2018    Diabetes Care Management Summary  Diabetes Education Record Assessment/Progress: Initial     PCP: Primary Doctor No  REFERRING PROVIDER: Maria Teresa Tyler C*    HISTORY OF PRESENT ILLNESS: 38 y.o. patient is in clinic today for gestational diabetes. She is 33w0d  gestation and having a boy. BRAXTON is 1/8/2019. Patient has had gestational diabetes for 5 days and is to be enrolled in the diabetes self-care training program. Patient denies polyuria, polydipsia, polyphagia, blurred vision, nausea, vomiting, diarrhea, and hypoglycemia.  DM medications: none     Recent A1C was   Hemoglobin A1C   Date Value Ref Range Status   06/18/2018 5.5 4.0 - 5.6 % Final     Comment:     ADA Screening Guidelines:  5.7-6.4%  Consistent with prediabetes  >or=6.5%  Consistent with diabetes  High levels of fetal hemoglobin interfere with the HbA1C  assay. Heterozygous hemoglobin variants (HbS, HgC, etc)do  not significantly interfere with this assay.   However, presence of multiple variants may affect accuracy.      for: HGBA1C, ADA recommends less than 7.0.      OGTT:  Results for ALEK CALERO (MRN [id number]) as of 11/20/2018 12:36   Ref. Range 5/8/2018 16:57 6/18/2018 10:27 6/18/2018 10:36 10/22/2018 10:03 11/14/2018 08:49   OB Glucose Screen Latest Ref Range: 70 - 140 mg/dL    167 (H)    Gluc Fast Latest Ref Range: 70 - 110 mg/dL     83   Gluc 1 HR Latest Units: mg/dL     211   Gluc 2 HR Latest Units: mg/dL     181   Gluc 3 HR Latest Units: mg/dL     69       Weight: 101.9 kg (224 lb 10.4 oz)   Height: 5' 4" (162.6 cm)     Diabetes Type  Diabetes Type : Gestational    Diabetes History  Diabetes Diagnosis: 0-1 year  Current Treatment: Diet    Health Maintenance was reviewed today with patient. Discussed with patient importance of routine eye exams, foot exams/foot care, blood work (i.e.: A1c, microalbumin, and lipid), dental visits, yearly flu vaccine, " and pneumonia vaccine as indicated by PCP. Patient verbalized understanding.     Health Maintenance Topics with due status: Not Due       Topic Last Completion Date    Pap Smear with HPV Cotest 04/24/2017    TETANUS VACCINE 11/14/2018     Health Maintenance Due   Topic Date Due    Lipid Panel  1982       Nutrition  Meal Planning: water  What type of sweetener do you use?: sugar  What type of beverages do you drink?: milk, water, sport drinks(8-9  large cups water)  Meal Plan 24 Hour Recall - Breakfast: Life cereal w/2% milk, 1 PopTart, milk  Meal Plan 24 Hour Recall - Lunch: 3 hot dogs, 1 bun, Fritos, 1/2 Gatorade   Meal Plan 24 Hour Recall - Dinner: hamburger meggan steamed broccoli, cauliflower and carrots, 1/4 cup white rice and gravy, water  Meal Plan 24 Hour Recall - Snack: post lunch - whole banana, 1/2 candy bar // post dinner - qian banana smoothie     Monitoring   Self Monitoring : needs meter  Blood Glucose Logs: No  Do you use a personal continuous glucose monitor?: No    Exercise   Exercise Type: none    Current Diabetes Treatment   Current Treatment: Diet    Social History  Educational Level: High School  Occupation: works from home -   Smoking Status: Ex Smoker(quit 4 yrs ago)  Alcohol Use: Never            DDS-2 Score  ( > 3 = SIGNIFICANT DISTRESS): 1                   Barriers to Change  Barriers to Change: None  Learning Challenges : None    Readiness to Learn   Readiness to Learn : Acceptance    Cultural Influences  Cultural Influences: No    Diabetes Education Assessment/Progress  Diabetes Disease Process (diabetes disease process and treatment options): Discussion, Individual Session, Demonstrates Understanding/Competency(verbalizes/demonstrates)  Nutrition (Incorporating nutritional management into one's lifestyle): Discussion, Instructed, Individual Session, Demonstrates Understanding/Competency (verbalizes/demonstrates), Written Materials Provided  Physical Activity  (incorporating physical activity into one's lifestyle): Discussion, Individual Session, Demonstrates Understanding/Competency (verbalizes/demonstrates)  Medications (states correct name, dose, onset, peak, duration, side effects & timing of meds): Discussion, Individual Session, Demonstrates Understanding/Competency(verbalizes/demonstrates)  Monitoring (monitoring blood glucose/other parameters & using results): Discussion, Instructed, Individual Session, Demonstrates Understanding/Competency (verbalizes/demonstrates), Written Materials Provided  Acute Complications (preventing, detecting, and treating acute complications): Discussion, Instructed, Individual Session, Demonstrates Understanding/Competency (verbalizes/demonstrates), Written Materials Provided  Chronic Complications (preventing, detecting, and treating chronic complications): Discussion, Individual Session, Demonstrates Understanding/Competency (verbalizes/demonstrates)  Clinical (diabetes, other pertinent medical history, and relevant comorbidities reviewed during visit): Discussion, Individual Session, Demonstrates Understanding/Competency (verbalizes/demonstrates)  Cognitive (knowledge of self-management skills, functional health literacy): Discussion, Individual Session, Demonstrates Understanding/Competency (verbalizes/demonstrates)  Psychosocial (emotional response to diabetes): Discussion, Individual Session, Demonstrates Understanding/Competency (verbalizes/demonstrates)  Diabetes Distress and Support Systems: Other, Individual Session  Behavioral (readiness for change, lifestyle practices, self-care behaviors): Discussion, Individual Session, Demonstrates Understanding/Competency (verbalizes/demonstrates)    Goals  Patient has selected/evaluated goals during today's session: Yes, selected  Healthy Eating: Set(Follow meal plan; Avoid beverages with sugar and all fruit juices; choose low carb smoothies)  Start Date: 11/20/18  Target Date:  01/20/19  Physical Activity: Set(walk for 30 min 3-5 times per week )  Start Date: 11/20/18  Target Date: 01/20/19  Monitoring: Set(check BG 4x/day - fasting an 2hr pp)  Start Date: 11/20/18  Target Date: 01/20/19         Diabetes Care Plan/Intervention  Education Plan/Intervention: Individual Follow-Up DSMT, Other(f/u in 2 wks. Send in BG log in 1 wk. )    Diabetes Meal Plan  Calories: 2400  Carbohydrate Per Meal: 30-45g, 45-60g  Carbohydrate Per Snack : 15-30g    Today's Self-Management Care Plan was developed with the patient's input and is based on barriers identified during today's assessment.    The long and short-term goals in the care plan were written with the patient/caregiver's input. The patient has agreed to work toward these goals to improve her overall diabetes control.      The patient received a copy of today's self-management plan and verbalized understanding of the care plan, goals, and all of today's instructions.      The patient was encouraged to communicate with her physician and care team regarding her condition(s) and treatment.  I provided the patient with my contact information today and encouraged her to contact me via phone or patient portal as needed.     Education Units of Time   Time Spent: 60 min

## 2018-12-04 ENCOUNTER — PROCEDURE VISIT (OUTPATIENT)
Dept: OBSTETRICS AND GYNECOLOGY | Facility: CLINIC | Age: 38
End: 2018-12-04
Payer: COMMERCIAL

## 2018-12-04 ENCOUNTER — ROUTINE PRENATAL (OUTPATIENT)
Dept: OBSTETRICS AND GYNECOLOGY | Facility: CLINIC | Age: 38
End: 2018-12-04
Payer: COMMERCIAL

## 2018-12-04 ENCOUNTER — NUTRITION (OUTPATIENT)
Dept: DIABETES | Facility: CLINIC | Age: 38
End: 2018-12-04
Payer: COMMERCIAL

## 2018-12-04 VITALS
SYSTOLIC BLOOD PRESSURE: 112 MMHG | BODY MASS INDEX: 38.83 KG/M2 | DIASTOLIC BLOOD PRESSURE: 82 MMHG | WEIGHT: 226.19 LBS

## 2018-12-04 DIAGNOSIS — Z3A.35 35 WEEKS GESTATION OF PREGNANCY: ICD-10-CM

## 2018-12-04 DIAGNOSIS — O09.523 AMA (ADVANCED MATERNAL AGE) MULTIGRAVIDA 35+, THIRD TRIMESTER: ICD-10-CM

## 2018-12-04 DIAGNOSIS — O24.415 GESTATIONAL DIABETES MELLITUS (GDM) IN THIRD TRIMESTER CONTROLLED ON ORAL HYPOGLYCEMIC DRUG: Primary | ICD-10-CM

## 2018-12-04 DIAGNOSIS — O24.410 DIET CONTROLLED GESTATIONAL DIABETES MELLITUS (GDM) IN THIRD TRIMESTER: Primary | ICD-10-CM

## 2018-12-04 PROCEDURE — 0502F SUBSEQUENT PRENATAL CARE: CPT | Mod: S$GLB,,, | Performed by: MIDWIFE

## 2018-12-04 PROCEDURE — 76816 OB US FOLLOW-UP PER FETUS: CPT | Mod: S$GLB,,, | Performed by: OBSTETRICS & GYNECOLOGY

## 2018-12-04 PROCEDURE — 99999 PR PBB SHADOW E&M-EST. PATIENT-LVL III: CPT | Mod: PBBFAC,,, | Performed by: MIDWIFE

## 2018-12-04 PROCEDURE — 99999 PR PBB SHADOW E&M-EST. PATIENT-LVL I: CPT | Mod: PBBFAC,,, | Performed by: DIETITIAN, REGISTERED

## 2018-12-04 PROCEDURE — G0108 DIAB MANAGE TRN  PER INDIV: HCPCS | Mod: S$GLB,,, | Performed by: DIETITIAN, REGISTERED

## 2018-12-04 PROCEDURE — 76819 FETAL BIOPHYS PROFIL W/O NST: CPT | Mod: S$GLB,,, | Performed by: OBSTETRICS & GYNECOLOGY

## 2018-12-04 RX ORDER — GLYBURIDE 2.5 MG/1
2.5 TABLET ORAL
Qty: 30 TABLET | Refills: 1 | Status: SHIPPED | OUTPATIENT
Start: 2018-12-04 | End: 2018-12-14

## 2018-12-04 NOTE — PROGRESS NOTES
Doing well, good fm, pt saw diabetes ed this morning, fasting BG is on the higher end, pt states she is not very compliant during the day, rec made to start oral meds at night, discussed complications of diabetes in pregnancy, rx sent, US today for growth, BPP 8/8, MVP 6.3, EFW 55%, 6# 6oz, will have pt see Dr Borges at [place] to sign consents, GBS at that visit as well, rtc 1 wk

## 2018-12-04 NOTE — PROGRESS NOTES
Diabetes Education  Author: Marce Rutherford RD  Date: 12/4/2018    Diabetes Care Management Summary  Diabetes Education Record Assessment/Progress: Comprehensive/Group  Current Diabetes Risk Level: Low     PCP: Primary Doctor No  REFERRING PROVIDER: Maria Teresa Tyler C*     HISTORY OF PRESENT ILLNESS: 38 y.o. patient is in clinic today for gestational diabetes. She is 35w0d  gestation and having a boy. BRAXTON is 1/8/2019. Patient has had gestational diabetes for 5 days and is to be enrolled in the diabetes self-care training program. Patient denies polyuria, polydipsia, polyphagia, blurred vision, nausea, vomiting, diarrhea, and hypoglycemia.  DM medications: none     Diabetes Type  Diabetes Type : Gestational    Diabetes History  Diabetes Diagnosis: 0-1 year  Current Treatment: Exercise    Health Maintenance was reviewed today with patient. Discussed with patient importance of routine eye exams, foot exams/foot care, blood work (i.e.: A1c, microalbumin, and lipid), dental visits, yearly flu vaccine, and pneumonia vaccine as indicated by PCP. Patient verbalized understanding.     Health Maintenance Topics with due status: Not Due       Topic Last Completion Date    Pap Smear with HPV Cotest 04/24/2017    TETANUS VACCINE 11/14/2018     Health Maintenance Due   Topic Date Due    Lipid Panel  1982       Nutrition  Meal Planning: water, 3 meals per day, snacks between meal(occasionally eating sweets, don at night)  What type of sweetener do you use?: sugar  What type of beverages do you drink?: milk, water, sport drinks(8-9  large cups water)  Meal Plan 24 Hour Recall - Breakfast: multigrain cereal w/[place] milk  Meal Plan 24 Hour Recall - Lunch: egg salad sandwich on light bread, air popped chips, water  Meal Plan 24 Hour Recall - Dinner: baked chicken, riced cauliflower, green beans, tomatoes, water  Meal Plan 24 Hour Recall - Snack: pre lunch: Atkins bar;  predinner: greek yogurt and canned pears in light  syrup    Monitoring   Self Monitoring : checking 3-4 times daily  Blood Glucose Logs: No  Do you use a personal continuous glucose monitor?: No  In the last month, how often have you had a low blood sugar reaction?: never     Fasting BG is high over half of time and pp is also high.   On Dec 3, she had good numbers but intake of carbohydrates was very low - 30 grams for lunch, minimal for dinner.             Exercise   Exercise Type: walking  Intensity: Low  Frequency: 3-5 Times per week(Tu, Wed, Fri)  Duration: 45 min    Current Diabetes Treatment   Current Treatment: Exercise                                     Barriers to Change  Barriers to Change: None  Learning Challenges : None              Diabetes Education Assessment/Progress  Diabetes Disease Process (diabetes disease process and treatment options): Discussion, Individual Session, Demonstrates Understanding/Competency(verbalizes/demonstrates)  Nutrition (Incorporating nutritional management into one's lifestyle): Discussion, Individual Session, Demonstrates Understanding/Competency (verbalizes/demonstrates)  Physical Activity (incorporating physical activity into one's lifestyle): Discussion, Individual Session, Demonstrates Understanding/Competency (verbalizes/demonstrates)  Medications (states correct name, dose, onset, peak, duration, side effects & timing of meds): Discussion, Individual Session, Demonstrates Understanding/Competency(verbalizes/demonstrates)  Monitoring (monitoring blood glucose/other parameters & using results): Discussion, Individual Session, Demonstrates Understanding/Competency (verbalizes/demonstrates)  Acute Complications (preventing, detecting, and treating acute complications): Discussion, Individual Session, Demonstrates Understanding/Competency (verbalizes/demonstrates)  Chronic Complications (preventing, detecting, and treating chronic complications): Discussion, Individual Session, Demonstrates Understanding/Competency  (verbalizes/demonstrates)  Clinical (diabetes, other pertinent medical history, and relevant comorbidities reviewed during visit): Discussion, Individual Session, Demonstrates Understanding/Competency (verbalizes/demonstrates)  Cognitive (knowledge of self-management skills, functional health literacy): Discussion, Individual Session, Demonstrates Understanding/Competency (verbalizes/demonstrates)  Psychosocial (emotional response to diabetes): Discussion, Individual Session, Demonstrates Understanding/Competency (verbalizes/demonstrates)  Diabetes Distress and Support Systems: Other, Individual Session  Behavioral (readiness for change, lifestyle practices, self-care behaviors): Discussion, Individual Session, Demonstrates Understanding/Competency (verbalizes/demonstrates)    Goals  Patient has selected/evaluated goals during today's session: Yes, selected  Healthy Eating: In Progress(Follow meal plan; Avoid beverages with sugar and all fruit juices; choose low carb smoothies)  Physical Activity: In Progress(walk for 30 min 3-5 times per week )  Monitoring: In Progress(check BG 4x/day - fasting an 2hr pp)         Diabetes Care Plan/Intervention  Education Plan/Intervention: Individual Follow-Up DSMT   Pt would benefit from medication to lower BG.     Diabetes Meal Plan  Calories: 2400  Carbohydrate Per Meal: 30-45g, 45-60g  Carbohydrate Per Snack : 15-30g    Today's Self-Management Care Plan was developed with the patient's input and is based on barriers identified during today's assessment.    The long and short-term goals in the care plan were written with the patient/caregiver's input. The patient has agreed to work toward these goals to improve her overall diabetes control.      The patient received a copy of today's self-management plan and verbalized understanding of the care plan, goals, and all of today's instructions.      The patient was encouraged to communicate with her physician and care team regarding  her condition(s) and treatment.  I provided the patient with my contact information today and encouraged her to contact me via phone or patient portal as needed.     Education Units of Time   Time Spent: 30 min

## 2018-12-04 NOTE — Clinical Note
She is following the diet closely. However, it seems that she has to eat VERY low carb to maintain good blood sugar. I think she would benefit from medication.

## 2018-12-14 ENCOUNTER — NUTRITION (OUTPATIENT)
Dept: DIABETES | Facility: CLINIC | Age: 38
End: 2018-12-14
Payer: COMMERCIAL

## 2018-12-14 ENCOUNTER — PROCEDURE VISIT (OUTPATIENT)
Dept: OBSTETRICS AND GYNECOLOGY | Facility: CLINIC | Age: 38
End: 2018-12-14
Payer: COMMERCIAL

## 2018-12-14 ENCOUNTER — ROUTINE PRENATAL (OUTPATIENT)
Dept: OBSTETRICS AND GYNECOLOGY | Facility: CLINIC | Age: 38
End: 2018-12-14
Payer: COMMERCIAL

## 2018-12-14 VITALS — DIASTOLIC BLOOD PRESSURE: 70 MMHG | BODY MASS INDEX: 38.71 KG/M2 | WEIGHT: 225.5 LBS | SYSTOLIC BLOOD PRESSURE: 106 MMHG

## 2018-12-14 VITALS — HEIGHT: 64 IN | BODY MASS INDEX: 38.66 KG/M2 | WEIGHT: 226.44 LBS

## 2018-12-14 DIAGNOSIS — O24.410 DIET CONTROLLED GESTATIONAL DIABETES MELLITUS (GDM) IN THIRD TRIMESTER: Primary | ICD-10-CM

## 2018-12-14 DIAGNOSIS — O09.523 AMA (ADVANCED MATERNAL AGE) MULTIGRAVIDA 35+, THIRD TRIMESTER: ICD-10-CM

## 2018-12-14 DIAGNOSIS — O24.415 GESTATIONAL DIABETES MELLITUS (GDM) IN THIRD TRIMESTER CONTROLLED ON ORAL HYPOGLYCEMIC DRUG: ICD-10-CM

## 2018-12-14 DIAGNOSIS — O09.523 AMA (ADVANCED MATERNAL AGE) MULTIGRAVIDA 35+, THIRD TRIMESTER: Primary | ICD-10-CM

## 2018-12-14 DIAGNOSIS — O40.3XX0 POLYHYDRAMNIOS IN THIRD TRIMESTER COMPLICATION, SINGLE OR UNSPECIFIED FETUS: ICD-10-CM

## 2018-12-14 PROCEDURE — 76819 FETAL BIOPHYS PROFIL W/O NST: CPT | Mod: S$GLB,,, | Performed by: OBSTETRICS & GYNECOLOGY

## 2018-12-14 PROCEDURE — 99999 PR PBB SHADOW E&M-EST. PATIENT-LVL II: CPT | Mod: PBBFAC,,, | Performed by: DIETITIAN, REGISTERED

## 2018-12-14 PROCEDURE — 87081 CULTURE SCREEN ONLY: CPT

## 2018-12-14 PROCEDURE — 0502F SUBSEQUENT PRENATAL CARE: CPT | Mod: S$GLB,,, | Performed by: OBSTETRICS & GYNECOLOGY

## 2018-12-14 PROCEDURE — 99999 PR PBB SHADOW E&M-EST. PATIENT-LVL II: CPT | Mod: PBBFAC,,, | Performed by: OBSTETRICS & GYNECOLOGY

## 2018-12-14 PROCEDURE — G0108 DIAB MANAGE TRN  PER INDIV: HCPCS | Mod: S$GLB,,, | Performed by: DIETITIAN, REGISTERED

## 2018-12-14 RX ORDER — SODIUM CHLORIDE, SODIUM LACTATE, POTASSIUM CHLORIDE, CALCIUM CHLORIDE 600; 310; 30; 20 MG/100ML; MG/100ML; MG/100ML; MG/100ML
INJECTION, SOLUTION INTRAVENOUS CONTINUOUS
Status: CANCELLED | OUTPATIENT
Start: 2018-12-14

## 2018-12-14 RX ORDER — SODIUM CITRATE AND CITRIC ACID MONOHYDRATE 334; 500 MG/5ML; MG/5ML
30 SOLUTION ORAL
Status: CANCELLED | OUTPATIENT
Start: 2018-12-14

## 2018-12-14 RX ORDER — MISOPROSTOL 100 UG/1
800 TABLET ORAL
Status: CANCELLED | OUTPATIENT
Start: 2018-12-14

## 2018-12-14 RX ORDER — MUPIROCIN 20 MG/G
OINTMENT TOPICAL
Status: CANCELLED | OUTPATIENT
Start: 2018-12-14

## 2018-12-14 NOTE — PROGRESS NOTES
Diabetes Education  Author: Marce Rutherford, ISAMAR  Date: 2018    Diabetes Care Management Summary  Diabetes Education Record Assessment/Progress: Comprehensive/Group  Current Diabetes Risk Level: Low     PCP: Primary Doctor No  REFERRING PROVIDER: Maria Teresa Tyler C*     HISTORY OF PRESENT ILLNESS: 38 y.o. patient is in clinic today for gestational diabetes. She is 36w3d  gestation and having a boy. BRAXTON is 2019. Having  on 19. Patient denies polyuria, polydipsia, polyphagia, blurred vision, nausea, vomiting, diarrhea, and hypoglycemia.    DM medications: none - pt never picked up the glyburide from pharmacy. She started controlling her carbohydrate portions instead.      Diabetes Type  Diabetes Type : Gestational    Diabetes History  Diabetes Diagnosis: 0-1 year  Current Treatment: Diet  Reviewed Problem List with Patient: Yes    Health Maintenance was reviewed today with patient. Discussed with patient importance of routine eye exams, foot exams/foot care, blood work (i.e.: A1c, microalbumin, and lipid), dental visits, yearly flu vaccine, and pneumonia vaccine as indicated by PCP. Patient verbalized understanding.     Health Maintenance Topics with due status: Not Due       Topic Last Completion Date    Pap Smear with HPV Cotest 2017    TETANUS VACCINE 2018     Health Maintenance Due   Topic Date Due    Lipid Panel  1982       Nutrition  What type of sweetener do you use?: sugar  What type of beverages do you drink?: milk, water(8-9  large cups water, Crystal Light)  Meal Plan 24 Hour Recall - Breakfast: multigrain cereal w/[place] milk (bg 120 (only 1 hr after snack))  Meal Plan 24 Hour Recall - Lunch: [employer] chicken wings, jalapeno poppers (4 gm carb), small salad, water  Meal Plan 24 Hour Recall - Dinner: tuna noodle salad on light bread, water  Meal Plan 24 Hour Recall - Snack: 1 hr after breakfast: 1/2 bagel w/cream cheese // after lunch:  pb & jelly sandwich on  light bread    Monitoring   Self Monitoring : checking 3-4 times daily  Blood Glucose Logs: No  Do you use a personal continuous glucose monitor?: No  In the last month, how often have you had a low blood sugar reaction?: never  Can you tell when your blood sugar is too high?: no     Fasting BG and pp BG are much improved from last visit. Pt is following diet and monitoring carb intake consistently.     O        Exercise   Exercise Type: none    Current Diabetes Treatment   Current Treatment: Diet                                     Barriers to Change  Barriers to Change: None  Learning Challenges : None    Readiness to Learn   Readiness to Learn : Eager    Cultural Influences  Cultural Influences: No    Diabetes Education Assessment/Progress  Diabetes Disease Process (diabetes disease process and treatment options): Discussion, Individual Session, Demonstrates Understanding/Competency(verbalizes/demonstrates)  Nutrition (Incorporating nutritional management into one's lifestyle): Discussion, Individual Session, Demonstrates Understanding/Competency (verbalizes/demonstrates)  Physical Activity (incorporating physical activity into one's lifestyle): Discussion, Individual Session, Demonstrates Understanding/Competency (verbalizes/demonstrates)  Medications (states correct name, dose, onset, peak, duration, side effects & timing of meds): Discussion, Individual Session, Demonstrates Understanding/Competency(verbalizes/demonstrates)  Monitoring (monitoring blood glucose/other parameters & using results): Discussion, Individual Session, Demonstrates Understanding/Competency (verbalizes/demonstrates)  Acute Complications (preventing, detecting, and treating acute complications): Discussion, Individual Session, Demonstrates Understanding/Competency (verbalizes/demonstrates)  Chronic Complications (preventing, detecting, and treating chronic complications): Discussion, Individual Session, Demonstrates  Understanding/Competency (verbalizes/demonstrates)  Clinical (diabetes, other pertinent medical history, and relevant comorbidities reviewed during visit): Discussion, Individual Session, Demonstrates Understanding/Competency (verbalizes/demonstrates)  Cognitive (knowledge of self-management skills, functional health literacy): Discussion, Individual Session, Demonstrates Understanding/Competency (verbalizes/demonstrates)  Psychosocial (emotional response to diabetes): Discussion, Individual Session, Demonstrates Understanding/Competency (verbalizes/demonstrates)  Diabetes Distress and Support Systems: Other, Individual Session  Behavioral (readiness for change, lifestyle practices, self-care behaviors): Discussion, Individual Session, Demonstrates Understanding/Competency (verbalizes/demonstrates)    Goals  Patient has selected/evaluated goals during today's session: Yes, selected  Healthy Eating: In Progress(Follow meal plan; Avoid beverages with sugar and all fruit juices; choose low carb smoothies)  Physical Activity: % Met(walk for 30 min 3-5 times per week)  Met Percentage : 0%  Monitoring: In Progress(check BG 4x/day - fasting an 2hr pp)    Pt to monitor BG for a short time after delivery. Discussed risk for Type 2 DM associated with GDM.        Diabetes Care Plan/Intervention  Education Plan/Intervention: Other(Pt to call if f/u needed)       Diabetes Meal Plan  Calories: 2400  Carbohydrate Per Meal: 30-45g, 45-60g  Carbohydrate Per Snack : 15-30g    Today's Self-Management Care Plan was developed with the patient's input and is based on barriers identified during today's assessment.    The long and short-term goals in the care plan were written with the patient/caregiver's input. The patient has agreed to work toward these goals to improve her overall diabetes control.      The patient received a copy of today's self-management plan and verbalized understanding of the care plan, goals, and all of today's  instructions.      The patient was encouraged to communicate with her physician and care team regarding her condition(s) and treatment.  I provided the patient with my contact information today and encouraged her to contact me via phone or patient portal as needed.     Education Units of Time   Time Spent: 30 min

## 2018-12-14 NOTE — PROGRESS NOTES
US today: BPP 8/8, ARELY 21.3, MVP 7.4, cephalic  Pt reports no complaints.  Doing well.  Log book: all levels at goal  GBS done today.  Labor precautions and kick counts.  Pre-operative instructions given.  Hospital consents reviewed with pt and signed.  Continue with weekly BPP.

## 2018-12-17 ENCOUNTER — PATIENT MESSAGE (OUTPATIENT)
Dept: DIABETES | Facility: CLINIC | Age: 38
End: 2018-12-17

## 2018-12-17 LAB — BACTERIA SPEC AEROBE CULT: NORMAL

## 2018-12-21 ENCOUNTER — ROUTINE PRENATAL (OUTPATIENT)
Dept: OBSTETRICS AND GYNECOLOGY | Facility: CLINIC | Age: 38
End: 2018-12-21
Payer: COMMERCIAL

## 2018-12-21 ENCOUNTER — PROCEDURE VISIT (OUTPATIENT)
Dept: OBSTETRICS AND GYNECOLOGY | Facility: CLINIC | Age: 38
End: 2018-12-21
Payer: COMMERCIAL

## 2018-12-21 VITALS
WEIGHT: 227.06 LBS | SYSTOLIC BLOOD PRESSURE: 124 MMHG | DIASTOLIC BLOOD PRESSURE: 72 MMHG | BODY MASS INDEX: 38.98 KG/M2

## 2018-12-21 DIAGNOSIS — O24.415 GESTATIONAL DIABETES MELLITUS (GDM) IN THIRD TRIMESTER CONTROLLED ON ORAL HYPOGLYCEMIC DRUG: ICD-10-CM

## 2018-12-21 DIAGNOSIS — O40.3XX0 POLYHYDRAMNIOS IN THIRD TRIMESTER COMPLICATION, SINGLE OR UNSPECIFIED FETUS: ICD-10-CM

## 2018-12-21 DIAGNOSIS — O09.523 AMA (ADVANCED MATERNAL AGE) MULTIGRAVIDA 35+, THIRD TRIMESTER: ICD-10-CM

## 2018-12-21 PROCEDURE — 76819 PR US, OB, FETAL BIOPHYSICAL, W/O NST: ICD-10-PCS | Mod: S$GLB,,, | Performed by: OBSTETRICS & GYNECOLOGY

## 2018-12-21 PROCEDURE — 76819 FETAL BIOPHYS PROFIL W/O NST: CPT | Mod: S$GLB,,, | Performed by: OBSTETRICS & GYNECOLOGY

## 2018-12-21 PROCEDURE — 99999 PR PBB SHADOW E&M-EST. PATIENT-LVL II: CPT | Mod: PBBFAC,,, | Performed by: ADVANCED PRACTICE MIDWIFE

## 2018-12-21 PROCEDURE — 0502F SUBSEQUENT PRENATAL CARE: CPT | Mod: S$GLB,,, | Performed by: ADVANCED PRACTICE MIDWIFE

## 2018-12-21 NOTE — PROGRESS NOTES
Doing well, reports fm and denies vb lof or ctx   US today poly noted mvp 8 cm, mild, pt aware bpp 8/8.  Pt is scheduled for a r c/s on 1/2/19  RTC 1 week   Encouraged fetal kick counts

## 2018-12-26 ENCOUNTER — PATIENT MESSAGE (OUTPATIENT)
Dept: OBSTETRICS AND GYNECOLOGY | Facility: CLINIC | Age: 38
End: 2018-12-26

## 2018-12-26 NOTE — PRE ADMISSION SCREENING
Pre op instructions reviewed with patient per phone:    To confirm, you surgeon has scheduled you for a .  Surgery is scheduled 19 .  Your doctor will instruct on the time of  your surgery.    Please report to [facility] 4th floor at time instructed by your doctor.  Arrive 2 hours prior to surgery        IMPORTANT INSTRUCTIONS!!  Do not eat anything 8 hours prior to surgery.    You may have water and clear juice 3 hours prior to surgery.    OK to brush teeth, no gum, candy or mints!    So not shave pubic hair 7 days prior to surgery      SHOWERING INSTRUCTIONS:   The night before and morning prior to coming to the hospital:    In the shower, it is best practice to wash and rinse your hair with shampoo, rinse completely     Wet entire body and wash with anti-bacterial soap, rinse completely    With your hand, apply one packet of Hibiclens soap to abdomen from under breasts to above pubic bone, gently scrubbing for 5 minutes.  Do not scrub your skin too hard  Avoid getting Hibiclens on your head, face, or genitals (private parts)    Once you have completed the wash, rinse the Hibiclens thoroughly.      Do not wash with regular soap or anti-bacterial soap after using the Hibiclens    Pat yourself dry using clean dry towel.  DO NOT apply any lotions or powder to abdomen.    Put on clean clothes    It is also recommended best practice to remove all artificial nails/polish prior to surgery

## 2018-12-28 ENCOUNTER — ROUTINE PRENATAL (OUTPATIENT)
Dept: OBSTETRICS AND GYNECOLOGY | Facility: CLINIC | Age: 38
End: 2018-12-28
Payer: COMMERCIAL

## 2018-12-28 VITALS
SYSTOLIC BLOOD PRESSURE: 112 MMHG | BODY MASS INDEX: 39.89 KG/M2 | WEIGHT: 232.38 LBS | DIASTOLIC BLOOD PRESSURE: 76 MMHG

## 2018-12-28 DIAGNOSIS — Z3A.38 PREGNANCY WITH 38 COMPLETED WEEKS GESTATION: ICD-10-CM

## 2018-12-28 DIAGNOSIS — Z98.891 H/O: C-SECTION: Primary | ICD-10-CM

## 2018-12-28 PROCEDURE — 99999 PR PBB SHADOW E&M-EST. PATIENT-LVL II: CPT | Mod: PBBFAC,,, | Performed by: OBSTETRICS & GYNECOLOGY

## 2018-12-28 PROCEDURE — 0502F SUBSEQUENT PRENATAL CARE: CPT | Mod: S$GLB,,, | Performed by: OBSTETRICS & GYNECOLOGY

## 2018-12-28 NOTE — PROGRESS NOTES
Pt missed appt yesterday. No complaints. Pre-op cleaning materials given. Time for surgery & arrival to hospital discussed

## 2019-01-02 ENCOUNTER — ANESTHESIA (OUTPATIENT)
Dept: OBSTETRICS AND GYNECOLOGY | Facility: HOSPITAL | Age: 39
End: 2019-01-02
Payer: COMMERCIAL

## 2019-01-02 ENCOUNTER — HOSPITAL ENCOUNTER (INPATIENT)
Facility: HOSPITAL | Age: 39
LOS: 2 days | Discharge: HOME | End: 2019-01-04
Attending: OBSTETRICS & GYNECOLOGY | Admitting: OBSTETRICS & GYNECOLOGY
Payer: COMMERCIAL

## 2019-01-02 ENCOUNTER — ANESTHESIA EVENT (OUTPATIENT)
Dept: OBSTETRICS AND GYNECOLOGY | Facility: HOSPITAL | Age: 39
End: 2019-01-02
Payer: COMMERCIAL

## 2019-01-02 DIAGNOSIS — O09.523 AMA (ADVANCED MATERNAL AGE) MULTIGRAVIDA 35+, THIRD TRIMESTER: ICD-10-CM

## 2019-01-02 DIAGNOSIS — Z98.891 STATUS POST REPEAT LOW TRANSVERSE CESAREAN SECTION: Primary | ICD-10-CM

## 2019-01-02 DIAGNOSIS — O34.219 HISTORY OF CESAREAN DELIVERY, CURRENTLY PREGNANT: ICD-10-CM

## 2019-01-02 LAB
ABO + RH BLD: NORMAL
ALBUMIN SERPL BCP-MCNC: 2.8 G/DL
ALP SERPL-CCNC: 140 U/L
ALT SERPL W/O P-5'-P-CCNC: 9 U/L
ANION GAP SERPL CALC-SCNC: 10 MMOL/L
AST SERPL-CCNC: 21 U/L
BASOPHILS # BLD AUTO: 0.02 K/UL
BASOPHILS NFR BLD: 0.2 %
BILIRUB SERPL-MCNC: 0.4 MG/DL
BLD GP AB SCN CELLS X3 SERPL QL: NORMAL
BUN SERPL-MCNC: 8 MG/DL
CALCIUM SERPL-MCNC: 9.1 MG/DL
CHLORIDE SERPL-SCNC: 107 MMOL/L
CO2 SERPL-SCNC: 19 MMOL/L
CREAT SERPL-MCNC: 0.7 MG/DL
DIFFERENTIAL METHOD: ABNORMAL
EOSINOPHIL # BLD AUTO: 0.2 K/UL
EOSINOPHIL NFR BLD: 1.8 %
ERYTHROCYTE [DISTWIDTH] IN BLOOD BY AUTOMATED COUNT: 14.6 %
EST. GFR  (AFRICAN AMERICAN): >60 ML/MIN/1.73 M^2
EST. GFR  (NON AFRICAN AMERICAN): >60 ML/MIN/1.73 M^2
GLUCOSE SERPL-MCNC: 77 MG/DL
HCT VFR BLD AUTO: 38.8 %
HGB BLD-MCNC: 12.9 G/DL
LYMPHOCYTES # BLD AUTO: 2.1 K/UL
LYMPHOCYTES NFR BLD: 21.1 %
MCH RBC QN AUTO: 28.2 PG
MCHC RBC AUTO-ENTMCNC: 33.2 G/DL
MCV RBC AUTO: 85 FL
MONOCYTES # BLD AUTO: 0.7 K/UL
MONOCYTES NFR BLD: 7.3 %
NEUTROPHILS # BLD AUTO: 6.9 K/UL
NEUTROPHILS NFR BLD: 69.6 %
PLATELET # BLD AUTO: 204 K/UL
PMV BLD AUTO: 11.5 FL
POCT GLUCOSE: 104 MG/DL (ref 70–110)
POCT GLUCOSE: 94 MG/DL (ref 70–110)
POTASSIUM SERPL-SCNC: 3.9 MMOL/L
PROT SERPL-MCNC: 6.2 G/DL
RBC # BLD AUTO: 4.57 M/UL
SODIUM SERPL-SCNC: 136 MMOL/L
WBC # BLD AUTO: 9.85 K/UL

## 2019-01-02 PROCEDURE — 59510 PR FULL ROUT OBSTE CARE,CESAREAN DELIV: ICD-10-PCS | Mod: ,,, | Performed by: OBSTETRICS & GYNECOLOGY

## 2019-01-02 PROCEDURE — 37000008 HC ANESTHESIA 1ST 15 MINUTES: Performed by: OBSTETRICS & GYNECOLOGY

## 2019-01-02 PROCEDURE — 36000685 HC CESAREAN SECTION LEVEL I

## 2019-01-02 PROCEDURE — 37000009 HC ANESTHESIA EA ADD 15 MINS: Performed by: OBSTETRICS & GYNECOLOGY

## 2019-01-02 PROCEDURE — 11000001 HC ACUTE MED/SURG PRIVATE ROOM

## 2019-01-02 PROCEDURE — 63600175 PHARM REV CODE 636 W HCPCS: Performed by: OBSTETRICS & GYNECOLOGY

## 2019-01-02 PROCEDURE — 27200688 HC TRAY, SPINAL-HYPER/ ISOBARIC: Performed by: NURSE ANESTHETIST, CERTIFIED REGISTERED

## 2019-01-02 PROCEDURE — 59510 CESAREAN DELIVERY: CPT | Mod: ,,, | Performed by: OBSTETRICS & GYNECOLOGY

## 2019-01-02 PROCEDURE — 25000003 PHARM REV CODE 250: Performed by: NURSE ANESTHETIST, CERTIFIED REGISTERED

## 2019-01-02 PROCEDURE — 63600175 PHARM REV CODE 636 W HCPCS: Performed by: NURSE ANESTHETIST, CERTIFIED REGISTERED

## 2019-01-02 PROCEDURE — 86850 RBC ANTIBODY SCREEN: CPT

## 2019-01-02 PROCEDURE — 80053 COMPREHEN METABOLIC PANEL: CPT

## 2019-01-02 PROCEDURE — 25000003 PHARM REV CODE 250: Performed by: OBSTETRICS & GYNECOLOGY

## 2019-01-02 PROCEDURE — 85025 COMPLETE CBC W/AUTO DIFF WBC: CPT

## 2019-01-02 PROCEDURE — 63600175 PHARM REV CODE 636 W HCPCS: Performed by: ANESTHESIOLOGY

## 2019-01-02 PROCEDURE — 51702 INSERT TEMP BLADDER CATH: CPT

## 2019-01-02 RX ORDER — SIMETHICONE 80 MG
1 TABLET,CHEWABLE ORAL EVERY 6 HOURS PRN
Status: DISCONTINUED | OUTPATIENT
Start: 2019-01-02 | End: 2019-01-04 | Stop reason: HOSPADM

## 2019-01-02 RX ORDER — HYDROCODONE BITARTRATE AND ACETAMINOPHEN 10; 325 MG/1; MG/1
1 TABLET ORAL EVERY 4 HOURS PRN
Status: DISCONTINUED | OUTPATIENT
Start: 2019-01-02 | End: 2019-01-04 | Stop reason: HOSPADM

## 2019-01-02 RX ORDER — HYDROMORPHONE HYDROCHLORIDE 1 MG/ML
1 INJECTION, SOLUTION INTRAMUSCULAR; INTRAVENOUS; SUBCUTANEOUS EVERY 4 HOURS PRN
Status: DISCONTINUED | OUTPATIENT
Start: 2019-01-02 | End: 2019-01-04 | Stop reason: HOSPADM

## 2019-01-02 RX ORDER — MUPIROCIN 20 MG/G
OINTMENT TOPICAL
Status: DISCONTINUED | OUTPATIENT
Start: 2019-01-02 | End: 2019-01-02

## 2019-01-02 RX ORDER — SODIUM CHLORIDE, SODIUM LACTATE, POTASSIUM CHLORIDE, CALCIUM CHLORIDE 600; 310; 30; 20 MG/100ML; MG/100ML; MG/100ML; MG/100ML
INJECTION, SOLUTION INTRAVENOUS CONTINUOUS
Status: DISCONTINUED | OUTPATIENT
Start: 2019-01-02 | End: 2019-01-02

## 2019-01-02 RX ORDER — KETOROLAC TROMETHAMINE 30 MG/ML
30 INJECTION, SOLUTION INTRAMUSCULAR; INTRAVENOUS EVERY 6 HOURS
Status: COMPLETED | OUTPATIENT
Start: 2019-01-02 | End: 2019-01-03

## 2019-01-02 RX ORDER — OXYTOCIN/RINGER'S LACTATE 20/1000 ML
41.65 PLASTIC BAG, INJECTION (ML) INTRAVENOUS CONTINUOUS
Status: DISPENSED | OUTPATIENT
Start: 2019-01-02 | End: 2019-01-02

## 2019-01-02 RX ORDER — OXYTOCIN/RINGER'S LACTATE 20/1000 ML
PLASTIC BAG, INJECTION (ML) INTRAVENOUS CONTINUOUS PRN
Status: DISCONTINUED | OUTPATIENT
Start: 2019-01-02 | End: 2019-01-02

## 2019-01-02 RX ORDER — ONDANSETRON 2 MG/ML
INJECTION INTRAMUSCULAR; INTRAVENOUS
Status: DISCONTINUED | OUTPATIENT
Start: 2019-01-02 | End: 2019-01-02

## 2019-01-02 RX ORDER — SODIUM CHLORIDE, SODIUM LACTATE, POTASSIUM CHLORIDE, CALCIUM CHLORIDE 600; 310; 30; 20 MG/100ML; MG/100ML; MG/100ML; MG/100ML
INJECTION, SOLUTION INTRAVENOUS CONTINUOUS PRN
Status: DISCONTINUED | OUTPATIENT
Start: 2019-01-02 | End: 2019-01-02

## 2019-01-02 RX ORDER — DOCUSATE SODIUM 100 MG/1
100 CAPSULE, LIQUID FILLED ORAL DAILY
Status: DISCONTINUED | OUTPATIENT
Start: 2019-01-02 | End: 2019-01-04 | Stop reason: HOSPADM

## 2019-01-02 RX ORDER — OXYTOCIN/RINGER'S LACTATE 20/1000 ML
41.7 PLASTIC BAG, INJECTION (ML) INTRAVENOUS CONTINUOUS
Status: DISCONTINUED | OUTPATIENT
Start: 2019-01-02 | End: 2019-01-02

## 2019-01-02 RX ORDER — MISOPROSTOL 200 UG/1
800 TABLET ORAL
Status: DISCONTINUED | OUTPATIENT
Start: 2019-01-02 | End: 2019-01-02

## 2019-01-02 RX ORDER — MISOPROSTOL 200 UG/1
200 TABLET ORAL ONCE AS NEEDED
Status: DISCONTINUED | OUTPATIENT
Start: 2019-01-02 | End: 2019-01-04 | Stop reason: HOSPADM

## 2019-01-02 RX ORDER — PRENATAL WITH FERROUS FUM AND FOLIC ACID 3080; 920; 120; 400; 22; 1.84; 3; 20; 10; 1; 12; 200; 27; 25; 2 [IU]/1; [IU]/1; MG/1; [IU]/1; MG/1; MG/1; MG/1; MG/1; MG/1; MG/1; UG/1; MG/1; MG/1; MG/1; MG/1
1 TABLET ORAL DAILY
Status: DISCONTINUED | OUTPATIENT
Start: 2019-01-02 | End: 2019-01-04 | Stop reason: HOSPADM

## 2019-01-02 RX ORDER — HYDROCODONE BITARTRATE AND ACETAMINOPHEN 5; 325 MG/1; MG/1
1 TABLET ORAL EVERY 4 HOURS PRN
Status: DISCONTINUED | OUTPATIENT
Start: 2019-01-02 | End: 2019-01-04 | Stop reason: HOSPADM

## 2019-01-02 RX ORDER — HYDROCORTISONE 25 MG/G
CREAM TOPICAL 3 TIMES DAILY PRN
Status: DISCONTINUED | OUTPATIENT
Start: 2019-01-02 | End: 2019-01-04 | Stop reason: HOSPADM

## 2019-01-02 RX ORDER — EPHEDRINE SULFATE 50 MG/ML
INJECTION, SOLUTION INTRAVENOUS
Status: DISCONTINUED | OUTPATIENT
Start: 2019-01-02 | End: 2019-01-02

## 2019-01-02 RX ORDER — BUPIVACAINE HYDROCHLORIDE 7.5 MG/ML
INJECTION, SOLUTION INTRASPINAL
Status: DISCONTINUED | OUTPATIENT
Start: 2019-01-02 | End: 2019-01-02

## 2019-01-02 RX ORDER — IBUPROFEN 800 MG/1
800 TABLET ORAL EVERY 8 HOURS
Status: DISCONTINUED | OUTPATIENT
Start: 2019-01-03 | End: 2019-01-04 | Stop reason: HOSPADM

## 2019-01-02 RX ORDER — ONDANSETRON 8 MG/1
8 TABLET, ORALLY DISINTEGRATING ORAL EVERY 8 HOURS PRN
Status: DISCONTINUED | OUTPATIENT
Start: 2019-01-02 | End: 2019-01-04 | Stop reason: HOSPADM

## 2019-01-02 RX ORDER — DIPHENHYDRAMINE HCL 25 MG
25 CAPSULE ORAL EVERY 4 HOURS PRN
Status: DISCONTINUED | OUTPATIENT
Start: 2019-01-02 | End: 2019-01-04 | Stop reason: HOSPADM

## 2019-01-02 RX ORDER — AMMONIA 15 % (W/V)
0.3 AMPUL (EA) INHALATION CONTINUOUS PRN
Status: DISCONTINUED | OUTPATIENT
Start: 2019-01-02 | End: 2019-01-04 | Stop reason: HOSPADM

## 2019-01-02 RX ORDER — BISACODYL 10 MG
10 SUPPOSITORY, RECTAL RECTAL ONCE AS NEEDED
Status: DISCONTINUED | OUTPATIENT
Start: 2019-01-02 | End: 2019-01-04 | Stop reason: HOSPADM

## 2019-01-02 RX ORDER — MORPHINE SULFATE 1 MG/ML
INJECTION, SOLUTION EPIDURAL; INTRATHECAL; INTRAVENOUS
Status: COMPLETED | OUTPATIENT
Start: 2019-01-02 | End: 2019-01-02

## 2019-01-02 RX ORDER — ACETAMINOPHEN 325 MG/1
650 TABLET ORAL EVERY 6 HOURS PRN
Status: DISCONTINUED | OUTPATIENT
Start: 2019-01-02 | End: 2019-01-04 | Stop reason: HOSPADM

## 2019-01-02 RX ORDER — OXYTOCIN/RINGER'S LACTATE 20/1000 ML
333 PLASTIC BAG, INJECTION (ML) INTRAVENOUS CONTINUOUS
Status: DISCONTINUED | OUTPATIENT
Start: 2019-01-02 | End: 2019-01-02

## 2019-01-02 RX ORDER — DIPHENHYDRAMINE HYDROCHLORIDE 50 MG/ML
25 INJECTION INTRAMUSCULAR; INTRAVENOUS EVERY 4 HOURS PRN
Status: DISCONTINUED | OUTPATIENT
Start: 2019-01-02 | End: 2019-01-02 | Stop reason: RX

## 2019-01-02 RX ORDER — SODIUM CITRATE AND CITRIC ACID MONOHYDRATE 334; 500 MG/5ML; MG/5ML
30 SOLUTION ORAL
Status: DISCONTINUED | OUTPATIENT
Start: 2019-01-02 | End: 2019-01-02

## 2019-01-02 RX ORDER — AMOXICILLIN 250 MG
1 CAPSULE ORAL NIGHTLY PRN
Status: DISCONTINUED | OUTPATIENT
Start: 2019-01-02 | End: 2019-01-04 | Stop reason: HOSPADM

## 2019-01-02 RX ORDER — PROMETHAZINE HYDROCHLORIDE 25 MG/1
25 TABLET ORAL EVERY 6 HOURS PRN
Status: DISCONTINUED | OUTPATIENT
Start: 2019-01-02 | End: 2019-01-04 | Stop reason: HOSPADM

## 2019-01-02 RX ADMIN — Medication: at 07:01

## 2019-01-02 RX ADMIN — EPHEDRINE SULFATE 20 MG: 50 INJECTION, SOLUTION INTRAMUSCULAR; INTRAVENOUS; SUBCUTANEOUS at 07:01

## 2019-01-02 RX ADMIN — EPHEDRINE SULFATE 10 MG: 50 INJECTION, SOLUTION INTRAMUSCULAR; INTRAVENOUS; SUBCUTANEOUS at 07:01

## 2019-01-02 RX ADMIN — SODIUM CHLORIDE, SODIUM LACTATE, POTASSIUM CHLORIDE, AND CALCIUM CHLORIDE 1000 ML: .6; .31; .03; .02 INJECTION, SOLUTION INTRAVENOUS at 06:01

## 2019-01-02 RX ADMIN — MORPHINE SULFATE 0.2 MG: 1 INJECTION, SOLUTION EPIDURAL; INTRATHECAL; INTRAVENOUS at 07:01

## 2019-01-02 RX ADMIN — SODIUM CHLORIDE, SODIUM LACTATE, POTASSIUM CHLORIDE, AND CALCIUM CHLORIDE: .6; .31; .03; .02 INJECTION, SOLUTION INTRAVENOUS at 06:01

## 2019-01-02 RX ADMIN — KETOROLAC TROMETHAMINE 30 MG: 30 INJECTION, SOLUTION INTRAMUSCULAR at 05:01

## 2019-01-02 RX ADMIN — BUPIVACAINE HYDROCHLORIDE IN DEXTROSE 2 ML: 7.5 INJECTION, SOLUTION SUBARACHNOID at 07:01

## 2019-01-02 RX ADMIN — EPHEDRINE SULFATE 30 MG: 50 INJECTION, SOLUTION INTRAMUSCULAR; INTRAVENOUS; SUBCUTANEOUS at 07:01

## 2019-01-02 RX ADMIN — CEFAZOLIN 2 G: 330 INJECTION, POWDER, FOR SOLUTION INTRAMUSCULAR; INTRAVENOUS at 07:01

## 2019-01-02 RX ADMIN — HYDROCODONE BITARTRATE AND ACETAMINOPHEN 1 TABLET: 10; 325 TABLET ORAL at 09:01

## 2019-01-02 RX ADMIN — DIPHENHYDRAMINE HYDROCHLORIDE 25 MG: 25 CAPSULE ORAL at 07:01

## 2019-01-02 RX ADMIN — KETOROLAC TROMETHAMINE 30 MG: 30 INJECTION, SOLUTION INTRAMUSCULAR at 11:01

## 2019-01-02 RX ADMIN — SODIUM CHLORIDE, SODIUM LACTATE, POTASSIUM CHLORIDE, AND CALCIUM CHLORIDE: .6; .31; .03; .02 INJECTION, SOLUTION INTRAVENOUS at 07:01

## 2019-01-02 RX ADMIN — ONDANSETRON 4 MG: 2 INJECTION, SOLUTION INTRAMUSCULAR; INTRAVENOUS at 07:01

## 2019-01-02 NOTE — PLAN OF CARE
Problem: Adult Inpatient Plan of Care  Goal: Plan of Care Review  Outcome: Ongoing (interventions implemented as appropriate)  Pt with R C/S of viable male.  Pt with no c/o pain.  Vaginal bleeding minimal.  UO adequate.

## 2019-01-02 NOTE — NURSING
"Discussed feeding choice with mother.  Reviewed benefits of breastfeeding and risks of formula feeding. Patient given "What to Expect in the First 48 Hours" handout. Mother states her intention is breastfeeding.  Coffective counseling sheet Fall in Love discussed with mother. Reinforced immediate skin to skin, the magic first hour, importance of the first feeding and delaying routine procedures. Encouraged mother to download Coffective mobile willie if she has not already done so. Mother verbalies understanding.  "

## 2019-01-02 NOTE — ANESTHESIA PROCEDURE NOTES
Spinal    Diagnosis: Intrauterine pregnancy  Patient location during procedure: OR  Start time: 1/2/2019 7:11 AM  Timeout: 1/2/2019 7:09 AM  End time: 1/2/2019 7:17 AM  Staffing  Anesthesiologist: Marjan Alonso DO  Performed: anesthesiologist   Preanesthetic Checklist  Completed: patient identified, site marked, surgical consent, pre-op evaluation, timeout performed, IV checked, risks and benefits discussed and monitors and equipment checked  Spinal Block  Patient position: sitting  Prep: Betadine  Patient monitoring: heart rate, cardiac monitor, continuous pulse ox, continuous capnometry and frequent blood pressure checks  Approach: midline  Location: L4-5  Injection technique: single shot  CSF Fluid: clear free-flowing CSF  Needle  Needle type: pencil-tip   Needle gauge: 22 G  Needle length: 3.5 in  Additional Documentation: negative aspiration for heme and no paresthesia on injection  Needle localization: anatomical landmarks  Assessment   Dermatomal levels determined by alcohol wipe  Ease of block: easy  Patient's tolerance of the procedure: comfortable throughout block and no complaints

## 2019-01-02 NOTE — ASSESSMENT & PLAN NOTE
Admit for scheduled C/S.  Surgery details, indications, risks, benefits, and alternatives were discussed.  Hospital consents were reviewed with pt and signed.  OR team ready.

## 2019-01-02 NOTE — LACTATION NOTE
Lactation rounds. Nurse just completed checking baby's blood sugar, which is 85. Baby began to demonstrate feeding cues. Assisted with feeding attempt, but baby became sleepy again and did not latch at this time. Hand expressed and provided 2 drops directly to baby's mouth and placed baby skin to skin with mother. Reviewed what to expect in the early  period, infant feeding/hunger cues, cue based feeding on demand, proper positioning and latch technique, uninterrupted skin to skin contact, unrestricted access to breast, and manual expression of milk. Encouraged to avoid artificial nipples, and reviewed risks. Encouraged to feed on demand 8 or more times per day and to request assistance as needed. Provided contact number for lactation.  Verbalizes understanding.     19 1130   Maternal Assessment   Breast Shape Right:;round;pendulous   Breast Density Right:;soft   Areola Right:;elastic   Nipples Right:;everted;retracting   Maternal Infant Feeding   Maternal Emotional State assist needed;independent   Infant Positioning cradle;clutch/football

## 2019-01-02 NOTE — SUBJECTIVE & OBJECTIVE
Obstetric HPI:  Patient reports None contractions, active fetal movement, No vaginal bleeding , No loss of fluid     This pregnancy has been complicated by GDM, Prior C/S x 2, AMA    Obstetric History       T2      L2     SAB0   TAB0   Ectopic0   Multiple0   Live Births2       # Outcome Date GA Lbr Elbert/2nd Weight Sex Delivery Anes PTL Lv   5 Current            4 AB  4w0d          3 Term    3.515 kg (7 lb 12 oz) F CS-Unspec   JEIMY   2 Term    3.572 kg (7 lb 14 oz) F CS-Unspec   JEIMY   1 AB                 Past Medical History:   Diagnosis Date    Asthma      Past Surgical History:   Procedure Laterality Date     SECTION         PTA Medications   Medication Sig    blood sugar diagnostic Strp 1 strip by Misc.(Non-Drug; Combo Route) route 4 (four) times daily. To check BG 4 times daily to use with insurance preferred meter.    blood-glucose meter kit To check BG 4 times daily to use with insurance preferred meter.    lancets Misc 1 lancet by Misc.(Non-Drug; Combo Route) route 4 (four) times daily. To check BG 4 times daily to use with insurance preferred meter.    prenatal vit-iron fum-folic ac 65 mg iron- 1 mg Cap Take 1 tablet by mouth once daily.       Review of patient's allergies indicates:  No Known Allergies     Family History     Problem Relation (Age of Onset)    Colon cancer Maternal Grandfather    Diabetes Mother, Father    Hypertension Mother, Father    Ovarian cancer Maternal Aunt        Tobacco Use    Smoking status: Former Smoker     Types: Vaping with nicotine    Smokeless tobacco: Never Used    Tobacco comment: 3 per day    Substance and Sexual Activity    Alcohol use: No     Alcohol/week: 0.0 oz     Comment: socially     Drug use: No    Sexual activity: Yes     Partners: Male     Birth control/protection: None     Review of Systems   Constitutional: Positive for fatigue. Negative for activity change, appetite change, chills, fever and unexpected weight change.    Respiratory: Negative for shortness of breath.    Cardiovascular: Negative for chest pain, palpitations and leg swelling.   Gastrointestinal: Positive for constipation. Negative for abdominal pain, bloating, blood in stool, diarrhea, nausea and vomiting.   Genitourinary: Negative for dysuria, flank pain, frequency, genital sores, hematuria, pelvic pain, urgency, vaginal bleeding, vaginal discharge, vaginal pain, urinary incontinence and vaginal odor.   Musculoskeletal: Positive for back pain.   Neurological: Negative for syncope and headaches.      Objective:     Vital Signs (Most Recent):  Temp: 98.1 °F (36.7 °C) (01/02/19 0603)  Pulse: 64 (01/02/19 0603)  BP: 112/78 (01/02/19 0603) Vital Signs (24h Range):  Temp:  [98.1 °F (36.7 °C)] 98.1 °F (36.7 °C)  Pulse:  [64] 64  BP: (112)/(78) 112/78        There is no height or weight on file to calculate BMI.    FHT: Cat 1 (reassuring)  TOCO:      Physical Exam:   Constitutional: She is oriented to person, place, and time. She appears well-developed and well-nourished. No distress.    HENT:   Head: Normocephalic and atraumatic.    Eyes: EOM are normal. Pupils are equal, round, and reactive to light.    Neck: Normal range of motion.    Cardiovascular: Normal rate, regular rhythm and normal heart sounds.     Pulmonary/Chest: Effort normal and breath sounds normal.        Abdominal: Soft. Bowel sounds are normal. She exhibits no distension. There is no tenderness.             Musculoskeletal: Normal range of motion and moves all extremeties. She exhibits no edema or tenderness.       Neurological: She is alert and oriented to person, place, and time.    Skin: Skin is warm and dry.    Psychiatric: She has a normal mood and affect. Her behavior is normal. Thought content normal.       Significant Labs:  Lab Results   Component Value Date    GROUPTRH O POS 06/18/2018    HEPBSAG Negative 06/18/2018    STREPBCULT No Group B Streptococcus isolated 12/14/2018       I have  personallly reviewed all pertinent lab results from the last 24 hours.

## 2019-01-02 NOTE — PLAN OF CARE
Problem: Adult Inpatient Plan of Care  Goal: Plan of Care Review  Outcome: Ongoing (interventions implemented as appropriate)  Plan of care reviewed. Fundus firm. Bonding with infant. Frequent checks made to ensure safety and comfort. Bed low, call bell within reach. Will continue to monitor.

## 2019-01-02 NOTE — H&P
[facility] -   Obstetrics  History & Physical    Patient Name: Micaela David  MRN: [id number]  Admission Date: 2019  Primary Care Provider: Primary Doctor No    Subjective:     Principal Problem:<principal problem not specified>    History of Present Illness:  39 yo  with IUP at 39w1d.  Pt is here for scheduled repeat C/S.  Reports no complaints.  Is ready for delivery.    Obstetric HPI:  Patient reports None contractions, active fetal movement, No vaginal bleeding , No loss of fluid     This pregnancy has been complicated by GDM, Prior C/S x 2, AMA    Obstetric History       T2      L2     SAB0   TAB0   Ectopic0   Multiple0   Live Births2       # Outcome Date GA Lbr Elbert/2nd Weight Sex Delivery Anes PTL Lv   5 Current            4 AB  4w0d          3 Term    3.515 kg (7 lb 12 oz) F CS-Unspec   JEIMY   2 Term    3.572 kg (7 lb 14 oz) F CS-Unspec   JEIMY   1 AB                 Past Medical History:   Diagnosis Date    Asthma      Past Surgical History:   Procedure Laterality Date     SECTION         PTA Medications   Medication Sig    blood sugar diagnostic Strp 1 strip by Misc.(Non-Drug; Combo Route) route 4 (four) times daily. To check BG 4 times daily to use with insurance preferred meter.    blood-glucose meter kit To check BG 4 times daily to use with insurance preferred meter.    lancets Misc 1 lancet by Misc.(Non-Drug; Combo Route) route 4 (four) times daily. To check BG 4 times daily to use with insurance preferred meter.    prenatal vit-iron fum-folic ac 65 mg iron- 1 mg Cap Take 1 tablet by mouth once daily.       Review of patient's allergies indicates:  No Known Allergies     Family History     Problem Relation (Age of Onset)    Colon cancer Maternal Grandfather    Diabetes Mother, Father    Hypertension Mother, Father    Ovarian cancer Maternal Aunt        Tobacco Use    Smoking status: Former Smoker     Types: Vaping with nicotine     Smokeless tobacco: Never Used    Tobacco comment: 3 per day    Substance and Sexual Activity    Alcohol use: No     Alcohol/week: 0.0 oz     Comment: socially     Drug use: No    Sexual activity: Yes     Partners: Male     Birth control/protection: None     Review of Systems   Constitutional: Positive for fatigue. Negative for activity change, appetite change, chills, fever and unexpected weight change.   Respiratory: Negative for shortness of breath.    Cardiovascular: Negative for chest pain, palpitations and leg swelling.   Gastrointestinal: Positive for constipation. Negative for abdominal pain, bloating, blood in stool, diarrhea, nausea and vomiting.   Genitourinary: Negative for dysuria, flank pain, frequency, genital sores, hematuria, pelvic pain, urgency, vaginal bleeding, vaginal discharge, vaginal pain, urinary incontinence and vaginal odor.   Musculoskeletal: Positive for back pain.   Neurological: Negative for syncope and headaches.      Objective:     Vital Signs (Most Recent):  Temp: 98.1 °F (36.7 °C) (01/02/19 0603)  Pulse: 64 (01/02/19 0603)  BP: 112/78 (01/02/19 0603) Vital Signs (24h Range):  Temp:  [98.1 °F (36.7 °C)] 98.1 °F (36.7 °C)  Pulse:  [64] 64  BP: (112)/(78) 112/78        There is no height or weight on file to calculate BMI.    FHT: Cat 1 (reassuring)  TOCO:      Physical Exam:   Constitutional: She is oriented to person, place, and time. She appears well-developed and well-nourished. No distress.    HENT:   Head: Normocephalic and atraumatic.    Eyes: EOM are normal. Pupils are equal, round, and reactive to light.    Neck: Normal range of motion.    Cardiovascular: Normal rate, regular rhythm and normal heart sounds.     Pulmonary/Chest: Effort normal and breath sounds normal.        Abdominal: Soft. Bowel sounds are normal. She exhibits no distension. There is no tenderness.             Musculoskeletal: Normal range of motion and moves all extremeties. She exhibits no edema or  tenderness.       Neurological: She is alert and oriented to person, place, and time.    Skin: Skin is warm and dry.    Psychiatric: She has a normal mood and affect. Her behavior is normal. Thought content normal.       Significant Labs:  Lab Results   Component Value Date    GROUPTRH O POS 2018    HEPBSAG Negative 2018    STREPBCULT No Group B Streptococcus isolated 2018       I have personallly reviewed all pertinent lab results from the last 24 hours.    Assessment/Plan:     38 y.o. female  at 39w1d for:    History of  delivery, currently pregnant    Admit for scheduled C/S.  Surgery details, indications, risks, benefits, and alternatives were discussed.  Hospital consents were reviewed with pt and signed.  OR team ready.          Burak Borges MD  Obstetrics  [facility] -

## 2019-01-02 NOTE — ANESTHESIA POSTPROCEDURE EVALUATION
Anesthesia Post Evaluation    Patient: Micaela David    Procedure(s) Performed: Procedure(s) (LRB):   SECTION (N/A)    Final Anesthesia Type: spinal  Patient location during evaluation: labor & delivery  Patient participation: Yes- Able to Participate  Level of consciousness: awake and alert and oriented  Post-procedure vital signs: reviewed and stable  Pain management: adequate  Airway patency: patent  PONV status at discharge: No PONV  Anesthetic complications: no      Cardiovascular status: hemodynamically stable  Respiratory status: room air, unassisted and spontaneous ventilation  Hydration status: euvolemic  Follow-up not needed.        Visit Vitals  BP (!) 124/57 (BP Location: Right leg, Patient Position: Lying)   Pulse 100   Temp 36.4 °C (97.6 °F) (Oral)   Resp 19   LMP 03/10/2018   SpO2 99%   Breastfeeding? No       Pain/Ronan Score: No Data Recorded

## 2019-01-02 NOTE — L&D DELIVERY NOTE
OPERATIVE NOTE    DATE:  2019    PRE-PROCEDURE COUNSELING:  Patient counseled on the risks, benefits, and alternatives to procedure.  Please see preoperative consents.   SCDs were applied and working prior to anesthesia induction.    PRE-OPERATIVE DIAGNOSIS:    1. IUP at 39w1d  2. History of C/S x 2 desires repeat  3. Gestational DM  4. AMA    POST-OPERATIVE DIAGNOSIS: same    ANESTHESIA: Spinal Anesthesia    PROCEDURE: Repeat Low Transverse  Section    SURGEON: Burak Borges MD    ASSISTANT: THAI Acosta    FINDINGS:  Single live male infant in cephalic presentation.  APGAR 9/9 Weight 7 lb 9 oz.  Normal uterus, tubes, ovaries.    SPECIMEN:  None    EBL:  500 mL    COMPLICATIONS:  None    IMPLANTS:  None    PROCEDURE IN DETAIL:   The patient was seen in the Holding Room. The risks, benefits and alternatives were discussed. The patient agrees with the proposed plan, giving informed consent.  The patient was taken to Operating Room, identified as Micaela David and the procedure verified as  Delivery. A Time Out was held and the above information confirmed.    Spinal anesthesia was administered, adequate level confirmed, and preoperative antibiotics administered.  The patient was draped and prepped in the usual sterile fashion.  A Pfannenstiel skin incision was made along the old incisional scar and carried down through the subcutaneous tissue to the fascia. Fascial incision was then made and extended transversely. The fascia was  from the underlying rectus muscles superiorly and inferiorly. The peritoneum was identified, tented up, and entered sharply.  This incision was then extended superiorly and inferiorly with good visualization of the underlying bowel and bladder.   The utero-vesical peritoneal reflection was incised transversely and the bladder flap was bluntly dissected from the lower uterine segment.  A low transverse uterine incision was made in the  midline and extended laterally.  There was clear amniotic fluid noted. The male infant was delivered from vertex presentation without difficulty and with Apgar scores of 9/9.  The umbilical cord was doubly clamped and cut.  Cord blood was obtained. The placenta was removed intact and appeared normal.  The uterine incision was then approximated in a running locked fashion with 0-Chromic.   The abdomen and pelvis were irrigated and hemostasis noted.  The rectus muscles were then loosely approximated at the midline with 2-0 Chromic.  The fascia was then approximated in a running fashion with 0-Vicryl.  Wound was irrigated and hemostasis noted.  The skin was approximated with 4-0 Monocryl in a running subcuticular fashion and a silver abdominal dressing applied.  Instrument, sponge, and needle counts were correct prior the abdominal closure and at the conclusion of the case.     DISPOSITION: to recovery PP room           CONDITION: stable            Delivery Information for  [place] David    Birth information:  YOB: 2017   Time of birth: 7:41 AM   Sex: male   Head Delivery Date/Time: 2019  7:41 AM   Delivery type: , Low Transverse   Gestational Age: 39w1d    Delivery Providers    Delivering clinician:  Burak Borges MD   Provider Role    uY Johansen RN Registered Nurse    Breezy Hunter, MARK ANTHONY Nurse Anesthetist    [facility] Assist    Jeramie Pinon RN Registered Nurse    Marjan Alonso DO Anesthesiologist            Measurements    Weight:  3430 g  Length:  51 cm  Head circumference:  33.5 cm  Chest circumference:  33.5 cm  Abdominal girth:  31 cm         Apgars    Living status:  Living  Apgars:   1 min.:   5 min.:   10 min.:   15 min.:   20 min.:     Skin color:   1  1       Heart rate:   2  2       Reflex irritability:   2  2       Muscle tone:   2  2       Respiratory effort:   2  2       Total:   9  9       Apgars assigned by:  JERAMIE  CHINO ARSHAD         Operative Delivery    Forceps attempted?:  No  Vacuum extractor attempted?:  No         Shoulder Dystocia    Shoulder dystocia present?:  No           Presentation    Presentation:  Vertex           Interventions/Resuscitation    Method:  Bulb Suctioning, Tactile Stimulation       Cord    Vessels:  3 vessels  Complications:  None  Delayed Cord Clamping?:  Yes  Cord Clamped Date/Time:  2019  7:42 AM  Cord Blood Disposition:  Lab  Gases Sent?:  No  Stem Cell Collection (by MD):  No       Placenta    Placenta delivery date/time:  2019 0744  Placenta removal:  Spontaneous  Placenta appearance:  Intact  Placenta disposition:  discarded           Labor Events:       labor: No     Labor Onset Date/Time:         Dilation Complete Date/Time:         Start Pushing Date/Time:       Rupture Date/Time:              Rupture type:           Fluid Amount:        Fluid Color:        Fluid Odor:        Membrane Status (PeriCalm):        Rupture Date/Time (PeriCalm):        Fluid Amount (PeriCalm):        Fluid Color (PeriCalm):         steroids: None     Antibiotics given for GBS: No     Induction: none     Indications for induction:        Augmentation:       Indications for augmentation:       Labor complications: None     Additional complications:          Cervical ripening:                     Delivery:      Episiotomy: None     Indication for Episiotomy:       Perineal Lacerations: None Repaired:      Periurethral Laceration: none Repaired:     Labial Laceration: none Repaired:     Sulcus Laceration: none Repaired:     Vaginal Laceration: No Repaired:     Cervical Laceration: No Repaired:     Repair suture:       Repair # of packets: 6     Vaginal delivery QBL (mL): 0      QBL (mL): 500     Combined Blood Loss (mL): 500     Vaginal Sweep Performed: No     Surgicount Correct: Yes       Other providers:       Anesthesia    Method:  Spinal          Details (if  applicable):  Trial of Labor No    Categorization: Repeat    Priority: Routine   Indications for : Repeat Section   Incision Type: low transverse     Additional  information:  Forceps:    Vacuum:    Breech:    Observed anomalies    Other (Comments):

## 2019-01-02 NOTE — HPI
39 yo  with IUP at 39w1d.  Pt is here for scheduled repeat C/S.  Reports no complaints.  Is ready for delivery.

## 2019-01-02 NOTE — TRANSFER OF CARE
Anesthesia Transfer of Care Note    Patient: Micaela David    Procedure(s) Performed: Procedure(s) (LRB):   SECTION (N/A)    Patient location: Labor and Delivery    Anesthesia Type: spinal    Transport from OR: Transported from OR on room air with adequate spontaneous ventilation    Post pain: adequate analgesia    Post assessment: no apparent anesthetic complications and tolerated procedure well    Post vital signs: stable    Level of consciousness: awake, oriented and alert    Nausea/Vomiting: no nausea/vomiting    Complications: none    Transfer of care protocol was followed      Last vitals:   Visit Vitals  BP (!) 124/57 (BP Location: Right leg, Patient Position: Lying)   Pulse 100   Temp 36.4 °C (97.6 °F) (Oral)   Resp 19   LMP 03/10/2018   SpO2 99%   Breastfeeding? No

## 2019-01-02 NOTE — ANESTHESIA PREPROCEDURE EVALUATION
01/02/2019  Micaela David is a 38 y.o., female.    Anesthesia Evaluation    I have reviewed the Patient Summary Reports.     I have reviewed the Medications.     Review of Systems  Anesthesia Hx:  No problems with previous Anesthesia  History of prior surgery of interest to airway management or planning: Previous anesthesia: Spinal Denies Family Hx of Anesthesia complications.   Denies Personal Hx of Anesthesia complications.   Social:  Former Smoker    Hematology/Oncology:  Hematology Normal   Oncology Normal     Cardiovascular:  Cardiovascular Normal     Pulmonary:   Asthma mild    Renal/:  Renal/ Normal     Hepatic/GI:  Hepatic/GI Normal    Musculoskeletal:  Musculoskeletal Normal    Neurological:  Neurology Normal    Endocrine:   Diabetes, gestational    Dermatological:  Skin Normal    Psych:  Psychiatric Normal           Physical Exam  General:  Well nourished    Airway/Jaw/Neck:  Airway Findings: Mouth Opening: Normal Tongue: Normal  General Airway Assessment: Adult  Mallampati: III  Improves to II with phonation.  TM Distance: Normal, at least 6 cm  Jaw/Neck Findings:  Neck ROM: Normal ROM      Dental:  Dental Findings: In tact        Mental Status:  Mental Status Findings:  Cooperative, Alert and Oriented         Anesthesia Plan  Type of Anesthesia, risks & benefits discussed:  Anesthesia Type:  spinal, general  Patient's Preference:   Intra-op Monitoring Plan: standard ASA monitors  Intra-op Monitoring Plan Comments:   Post Op Pain Control Plan: intrathecal opioid and per primary service following discharge from PACU  Post Op Pain Control Plan Comments:   Induction:    Beta Blocker:  Patient is not currently on a Beta-Blocker (No further documentation required).       Informed Consent: Patient understands risks and agrees with Anesthesia plan.  Questions answered. Anesthesia  consent signed with patient.  ASA Score: 2     Day of Surgery Review of History & Physical: I have interviewed and examined the patient. I have reviewed the patient's H&P dated:  There are no significant changes.  H&P update referred to the provider.         Ready For Surgery From Anesthesia Perspective.

## 2019-01-02 NOTE — ANESTHESIA RELEASE NOTE
Anesthesia Release from PACU Note    Patient: Micaela David    Procedure(s) Performed: Procedure(s) (LRB):   SECTION (N/A)    Anesthesia type: spinal    Post pain: Adequate analgesia    Post assessment: no apparent anesthetic complications and tolerated procedure well    Last Vitals:   Visit Vitals  BP (!) 124/57 (BP Location: Right leg, Patient Position: Lying)   Pulse 100   Temp 36.4 °C (97.6 °F) (Oral)   Resp 19   LMP 03/10/2018   SpO2 99%   Breastfeeding? No       Post vital signs: stable    Level of consciousness: awake, alert  and oriented    Nausea/Vomiting: no nausea/no vomiting    Complications: none    Airway Patency: patent    Respiratory: unassisted, spontaneous ventilation, room air    Cardiovascular: stable and blood pressure at baseline    Hydration: euvolemic

## 2019-01-03 LAB
POCT GLUCOSE: 83 MG/DL (ref 70–110)
POCT GLUCOSE: 83 MG/DL (ref 70–110)

## 2019-01-03 PROCEDURE — 99024 POSTOP FOLLOW-UP VISIT: CPT | Mod: ,,, | Performed by: PHYSICIAN ASSISTANT

## 2019-01-03 PROCEDURE — 25000003 PHARM REV CODE 250: Performed by: OBSTETRICS & GYNECOLOGY

## 2019-01-03 PROCEDURE — 11000001 HC ACUTE MED/SURG PRIVATE ROOM

## 2019-01-03 PROCEDURE — 99024 PR POST-OP FOLLOW-UP VISIT: ICD-10-PCS | Mod: ,,, | Performed by: PHYSICIAN ASSISTANT

## 2019-01-03 PROCEDURE — 63600175 PHARM REV CODE 636 W HCPCS: Performed by: OBSTETRICS & GYNECOLOGY

## 2019-01-03 RX ADMIN — KETOROLAC TROMETHAMINE 30 MG: 30 INJECTION, SOLUTION INTRAMUSCULAR at 06:01

## 2019-01-03 RX ADMIN — IBUPROFEN 800 MG: 800 TABLET ORAL at 01:01

## 2019-01-03 RX ADMIN — KETOROLAC TROMETHAMINE 30 MG: 30 INJECTION, SOLUTION INTRAMUSCULAR at 12:01

## 2019-01-03 RX ADMIN — Medication 1 TABLET: at 08:01

## 2019-01-03 RX ADMIN — IBUPROFEN 800 MG: 800 TABLET ORAL at 09:01

## 2019-01-03 RX ADMIN — DOCUSATE SODIUM 100 MG: 100 CAPSULE, LIQUID FILLED ORAL at 08:01

## 2019-01-03 NOTE — NURSING
Callejas catheter removed with catheter intact. Hygiene care performed. Pressure dressing removed, and patient showered.

## 2019-01-03 NOTE — SUBJECTIVE & OBJECTIVE
Hospital course: POD #1 Patient doing well with routine postoperative care.      Interval History:     She is doing well this morning. She is tolerating a regular diet without nausea or vomiting. She is voiding spontaneously. She is ambulating. She has not passed flatus, and has not a BM. Vaginal bleeding is mild. She denies fever or chills. Abdominal pain is mild and controlled with oral medications. She is breastfeeding. She desires circumcision for her male baby: yes.    Objective:     Vital Signs (Most Recent):  Temp: 98.3 °F (36.8 °C) (01/03/19 0400)  Pulse: 75 (01/03/19 0400)  Resp: 18 (01/03/19 0400)  BP: 109/65 (01/03/19 0400)  SpO2: 100 % (01/02/19 0918) Vital Signs (24h Range):  Temp:  [97.4 °F (36.3 °C)-98.3 °F (36.8 °C)] 98.3 °F (36.8 °C)  Pulse:  [] 75  Resp:  [18-19] 18  SpO2:  [98 %-100 %] 100 %  BP: (105-137)/(32-88) 109/65        There is no height or weight on file to calculate BMI.      Intake/Output Summary (Last 24 hours) at 1/3/2019 0726  Last data filed at 1/3/2019 0600  Gross per 24 hour   Intake 900 ml   Output 3750 ml   Net -2850 ml       Significant Labs:  Lab Results   Component Value Date    GROUPTRH O POS 01/02/2019    HEPBSAG Negative 06/18/2018    STREPBCULT No Group B Streptococcus isolated 12/14/2018     Recent Labs   Lab 01/02/19  0540   HGB 12.9   HCT 38.8       I have personallly reviewed all pertinent lab results from the last 24 hours.    Physical Exam:   Constitutional: She is oriented to person, place, and time. She appears well-developed and well-nourished.    HENT:   Head: Normocephalic.     Neck: Normal range of motion. No thyromegaly present.    Cardiovascular: Normal rate and regular rhythm.  Exam reveals edema (1+ bilateral lower extremities ).     Pulmonary/Chest: Effort normal and breath sounds normal.        Abdominal: Soft. Bowel sounds are normal. She exhibits abdominal incision (aquasel dressing clean dry & intact).     Genitourinary:   Genitourinary  Comments: Fundus nontender near the umbilicus             Musculoskeletal: Normal range of motion and moves all extremeties.       Neurological: She is alert and oriented to person, place, and time.    Skin: Skin is warm.    Psychiatric: She has a normal mood and affect.

## 2019-01-03 NOTE — HOSPITAL COURSE
POD #1 Patient doing well with routine postoperative care.      01/04/2019  Patient has met all goals for discharge.  She is tolerating PO and voiding without difficulty.  Pain is well controlled.  Stable for discharge home.  Follow up in 1 week for dressing removal and 4 weeks with Dr. Borges for postop visit.

## 2019-01-03 NOTE — ASSESSMENT & PLAN NOTE
Patient seen and examined by myself & Dr. Wolf.   Doing well on morning of POD#1 s/p repeat C/S.  No complaints or issues.  Still not passing flatus, but voiding without difficulty.  Tolerating PO.    Encouraged ambulation and plan for discharge tomorrow.

## 2019-01-03 NOTE — LACTATION NOTE
Infant weight loss and output is WDL.      Mother verbalizes understanding of expected  behaviors and output for the first 48 hours of life.  Discussed the importance of cue based feedings on demand, unrestricted access to the breast, and frequent uninterrupted skin to skin contact.  Risk and implications of artificial nipples and non medically indicated formula supplementation discussed.  Encouraged mother to call for assistance when desired or when infant is showing signs of hunger, contact number provided, mother verbalizes understanding.

## 2019-01-03 NOTE — PLAN OF CARE
Problem: Adult Inpatient Plan of Care  Goal: Plan of Care Review  Outcome: Ongoing (interventions implemented as appropriate)  The patient is progressing appropriately. Vitals have remained stable, voiding spontaneously, and ambulating without difficulty. Patient owes nurse 2 more voids post carrizales removal. Bleeding is light and fundus firm. Aquacel dressing intact, clean, and dry. Pain is controlled with scheduled toradol and prn Norco. Blood sugar readings have been 104 and 83. The patient is breast feeding without difficulty. Appropriate bonding is occurring with infant. Will continue to monitor. The nurse encouraged the patient to call for assistance as needed.

## 2019-01-03 NOTE — PLAN OF CARE
Problem: Adult Inpatient Plan of Care  Goal: Plan of Care Review  Outcome: Ongoing (interventions implemented as appropriate)  VSS, bleeding light, fundus firm without massage. Pain controlled with Po motrin. Aquacel dressing clean dry and intact. Appears to be bonding well with infant, breastfeeding infant. Will continue to monitor.

## 2019-01-03 NOTE — PROGRESS NOTES
[facility] -   Obstetrics  Postpartum Progress Note    Patient Name: Micaela David  MRN: [id number]  Admission Date: 2019  Hospital Length of Stay: 1 days  Attending Physician: Burak Borges MD  Primary Care Provider: Primary Doctor No    Subjective:     Principal Problem:History of  delivery, currently pregnant    Hospital course: POD #1 Patient doing well with routine postoperative care.      Interval History:     She is doing well this morning. She is tolerating a regular diet without nausea or vomiting. She is voiding spontaneously. She is ambulating. She has not passed flatus, and has not a BM. Vaginal bleeding is mild. She denies fever or chills. Abdominal pain is mild and controlled with oral medications. She is breastfeeding. She desires circumcision for her male baby: yes.    Objective:     Vital Signs (Most Recent):  Temp: 98.3 °F (36.8 °C) (19 0400)  Pulse: 75 (19 0400)  Resp: 18 (19 0400)  BP: 109/65 (19 0400)  SpO2: 100 % (19 0918) Vital Signs (24h Range):  Temp:  [97.4 °F (36.3 °C)-98.3 °F (36.8 °C)] 98.3 °F (36.8 °C)  Pulse:  [] 75  Resp:  [18-19] 18  SpO2:  [98 %-100 %] 100 %  BP: (105-137)/(32-88) 109/65        There is no height or weight on file to calculate BMI.      Intake/Output Summary (Last 24 hours) at 1/3/2019 0726  Last data filed at 1/3/2019 0600  Gross per 24 hour   Intake 900 ml   Output 3750 ml   Net -2850 ml       Significant Labs:  Lab Results   Component Value Date    GROUPTRH O POS 2019    HEPBSAG Negative 2018    STREPBCULT No Group B Streptococcus isolated 2018     Recent Labs   Lab 19  0540   HGB 12.9   HCT 38.8       I have personallly reviewed all pertinent lab results from the last 24 hours.    Physical Exam:   Constitutional: She is oriented to person, place, and time. She appears well-developed and well-nourished.    HENT:   Head: Normocephalic.     Neck: Normal range of  motion. No thyromegaly present.    Cardiovascular: Normal rate and regular rhythm.  Exam reveals edema (1+ bilateral lower extremities ).     Pulmonary/Chest: Effort normal and breath sounds normal.        Abdominal: Soft. Bowel sounds are normal. She exhibits abdominal incision (aquasel dressing clean dry & intact).     Genitourinary:   Genitourinary Comments: Fundus nontender near the umbilicus             Musculoskeletal: Normal range of motion and moves all extremeties.       Neurological: She is alert and oriented to person, place, and time.    Skin: Skin is warm.    Psychiatric: She has a normal mood and affect.       Assessment/Plan:     38 y.o. female  for:    * History of  delivery, currently pregnant    Admit for scheduled C/S.  Surgery details, indications, risks, benefits, and alternatives were discussed.  Hospital consents were reviewed with pt and signed.  OR team ready.      Status post repeat low transverse  section    Patient seen and examined by myself & Dr. Wolf.   Doing well on morning of POD#1 s/p repeat C/S.  No complaints or issues.  Still not passing flatus, but voiding without difficulty.  Tolerating PO.    Encouraged ambulation and plan for discharge tomorrow.          Disposition: As patient meets milestones, will plan to discharge tomorrow.    Annika Martinez PA-C  Obstetrics  [facility]

## 2019-01-04 VITALS
TEMPERATURE: 98 F | SYSTOLIC BLOOD PRESSURE: 125 MMHG | RESPIRATION RATE: 18 BRPM | HEART RATE: 71 BPM | OXYGEN SATURATION: 100 % | DIASTOLIC BLOOD PRESSURE: 78 MMHG

## 2019-01-04 PROCEDURE — 25000003 PHARM REV CODE 250: Performed by: OBSTETRICS & GYNECOLOGY

## 2019-01-04 RX ORDER — HYDROCODONE BITARTRATE AND ACETAMINOPHEN 5; 325 MG/1; MG/1
1 TABLET ORAL EVERY 6 HOURS PRN
Qty: 25 TABLET | Refills: 0 | Status: SHIPPED | OUTPATIENT
Start: 2019-01-04 | End: 2019-01-31

## 2019-01-04 RX ORDER — IBUPROFEN 800 MG/1
800 TABLET ORAL EVERY 8 HOURS
Qty: 60 TABLET | Refills: 0 | Status: SHIPPED | OUTPATIENT
Start: 2019-01-04 | End: 2020-06-26 | Stop reason: ALTCHOICE

## 2019-01-04 RX ADMIN — Medication 1 TABLET: at 09:01

## 2019-01-04 RX ADMIN — DOCUSATE SODIUM 100 MG: 100 CAPSULE, LIQUID FILLED ORAL at 09:01

## 2019-01-04 RX ADMIN — IBUPROFEN 800 MG: 800 TABLET ORAL at 05:01

## 2019-01-04 NOTE — NURSING
VSS, bleeding light, fundus firm without massage. Aquacel dressing dry and intact, scant amount of dried drainage on dressing. Discharge instructions reviewed with pt and follow up appt made for pt and pt verbalizes understanding. AVS handout given. Bonding well with infant, breastfeeding infant. Discharged to car via wheelchair with infant on lap by staff.

## 2019-01-04 NOTE — LACTATION NOTE
Lactation Rounds:    Weight loss -5.8%. Voids and stools WNL. Mother reports nipples are cracked and sore. Upon assessment bilateral nipples cracked at base of nipple. Reviewed hand expression and nipple care; mother able to return demonstrate. Baby is showing feeding cues. Mother attempted to latch infant in cradle position with poor technique and very shallow latch. Position changed encouraged. Helped mother to settle in a football hold position on the right breast in chair. Reviewed deep asymmetric latch and proper positioning. Mother is able to demonstrate back and deep latch easily obtained. Audible swallows noted, and mother denies pain or discomfort. Baby fed until content, and nipple shape and color is WDL upon unlatching. Mother independently latched infant in football hold on the left breast, audible swallows, mother denies pain with latch. Infant fed until content. Nipple shape and color WNL.     Lactation discharge information reviewed.  Mother is aware of warm line, and outpatient consultations and monthly support gatherings. Encouraged mother to contact lactation with any questions, concerns, or problems. Contact numbers provided, and mother verbalizes understanding.         01/04/19 0940   (RETIRED) Maternal Infant Assessment   Breast Shape round;pendulous;Bilateral:   Breast Density Bilateral:;filling   Areola Bilateral:;elastic   Nipples Bilateral:;everted   (RETIRED) Infant Assessment   Skin Color pink   LATCH Score   Latch 2-->grasps breast, tongue down, lips flanged, rhythmic sucking   Audible Swallowing 2-->spontaneous and intermittent (24 hrs old)   Type of Nipple 2-->everted (after stimulation)   Comfort (Breast/Nipple) 1-->filling, red/small blisters/bruises, mild/mod discomfort   Hold (Positioning) 1-->minimal assist, teach one side, mother does other, staff holds   Score 8   (RETIRED) Maternal Infant Feeding   Maternal Emotional State assist needed;relaxed   Infant Positioning  cradle;clutch/football   Signs of Milk Transfer audible swallow;infant jaw motion present;suck/swallow ratio   Pain with Feeding no   Nipple Shape After Feeding, Left wdl   Nipple Shape After Feeding, Right wdl   Latch Assistance yes   (RETIRED) Feeding Infant   Feeding Readiness Cues hand to mouth movements   Feeding Tolerance/Success adequate pause for breath;alert for feeding;coordinated suck;coordinated swallow   Effective Latch During Feeding yes

## 2019-01-04 NOTE — PLAN OF CARE
Problem: Adult Inpatient Plan of Care  Goal: Plan of Care Review  Outcome: Ongoing (interventions implemented as appropriate)  Pt progressing well bonding with infant. Pain controlled with po medication. Fundus firm without massage with light bleeding per pt. Breast feeding going to well. VSS. Safety maintained. Will continue to monitor.

## 2019-01-04 NOTE — DISCHARGE INSTRUCTIONS

## 2019-01-04 NOTE — DISCHARGE SUMMARY
[facility] -   Obstetrics  Discharge Summary      Patient Name: Micaela David  MRN: [id number]  Admission Date: 2019  Hospital Length of Stay: 2 days  Discharge Date and Time:  2019 7:16 AM  Attending Physician: Burak Borges MD   Discharging Provider: Annika Martinez PA-C  Primary Care Provider: Primary Doctor No    HPI: 37 yo  with IUP at 39w1d.  Pt is here for scheduled repeat C/S.  Reports no complaints.  Is ready for delivery.    Procedure(s) (LRB):   SECTION (N/A)     Hospital Course:   POD #1 Patient doing well with routine postoperative care.      2019  Patient has met all goals for discharge.  She is tolerating PO and voiding without difficulty.  Pain is well controlled.  Stable for discharge home.  Follow up in 1 week for dressing removal and 4 weeks with Dr. Borges for postop visit.          Final Active Diagnoses:    Diagnosis Date Noted POA    PRINCIPAL PROBLEM:  History of  delivery, currently pregnant [O34.219] 2018 Yes    Status post repeat low transverse  section [Z98.891] 2019 Not Applicable    Polyhydramnios in third trimester [O40.3XX0] 2018 Yes    Gestational diabetes mellitus (GDM) in third trimester controlled on oral hypoglycemic drug [O24.415] 2018 Yes    AMA (advanced maternal age) multigravida 35+, third trimester [O09.523] 2018 Yes      Problems Resolved During this Admission:        Labs: All labs within the past 24 hours have been reviewed    Feeding Method: breast    Immunizations     Date Immunization Status Dose Route/Site Given by    19 1602 MMR Deferred (Other) 0.5 mL Subcutaneous/Left deltoid Jeremy Virk RN    19 160 Tdap Deferred (Other) 0.5 mL Intramuscular/Left deltoid Jeremy Virk RN          Delivery:    Episiotomy: None   Lacerations: None   Repair suture:     Repair # of packets: 6   Blood loss (ml): 0     Birth information:  Date of  birth: 2017   Time of birth: 7:41 AM   Sex: male   Delivery type: , Low Transverse   Gestational Age: 39w1d    Delivery Clinician:      Other providers:       Additional  information:  Forceps:    Vacuum:    Breech:    Observed anomalies      Living?:           APGARS  One minute Five minutes Ten minutes   Skin color:         Heart rate:         Grimace:         Muscle tone:         Breathing:         Totals: 9  9        Placenta: Delivered:       appearance    Pending Diagnostic Studies:     None          Discharged Condition: good    Disposition: Home or Self Care    Follow Up:  Follow-up Information     OB GYN NURSE, DAREK In 1 week.    Why:  Bandage removal           Burak Borges MD In 4 weeks.    Specialty:  Obstetrics and Gynecology  Why:  Post op visit  Contact information:  [address]  [place] [address] [phone]                 Patient Instructions:      Call MD for:  temperature >100.4     Call MD for:  severe uncontrolled pain     Call MD for:  redness, tenderness, or signs of infection (pain, swelling, redness, odor or green/yellow discharge around incision site)     Call MD for:  severe persistent headache     Call MD for:  persistent dizziness, light-headedness, or visual disturbances     Leave dressing on - Keep it clean, dry, and intact until clinic visit   Order Comments: Showers only- no baths.     Medications:  Current Discharge Medication List      START taking these medications    Details   HYDROcodone-acetaminophen (NORCO) 5-325 mg per tablet Take 1 tablet by mouth every 6 (six) hours as needed.  Qty: 25 tablet, Refills: 0      ibuprofen (ADVIL,MOTRIN) 800 MG tablet Take 1 tablet (800 mg total) by mouth every 8 (eight) hours.  Qty: 60 tablet, Refills: 0         CONTINUE these medications which have NOT CHANGED    Details   blood sugar diagnostic Strp 1 strip by Misc.(Non-Drug; Combo Route) route 4 (four) times daily. To check BG 4 times daily to use with insurance  preferred meter.  Qty: 100 strip, Refills: 11      blood-glucose meter kit To check BG 4 times daily to use with insurance preferred meter.  Qty: 1 each, Refills: 0      lancets Misc 1 lancet by Misc.(Non-Drug; Combo Route) route 4 (four) times daily. To check BG 4 times daily to use with insurance preferred meter.  Qty: 100 each, Refills: 11      prenatal vit-iron fum-folic ac 65 mg iron- 1 mg Cap Take 1 tablet by mouth once daily.  Qty: 90 each, Refills: 3             Annika Martinez PA-C  Obstetrics  [facility]

## 2019-01-09 ENCOUNTER — CLINICAL SUPPORT (OUTPATIENT)
Dept: OBSTETRICS AND GYNECOLOGY | Facility: CLINIC | Age: 39
End: 2019-01-09
Payer: COMMERCIAL

## 2019-01-09 PROCEDURE — 99999 PR PBB SHADOW E&M-EST. PATIENT-LVL I: ICD-10-PCS | Mod: PBBFAC,,,

## 2019-01-09 PROCEDURE — 99999 PR PBB SHADOW E&M-EST. PATIENT-LVL I: CPT | Mod: PBBFAC,,,

## 2019-01-09 NOTE — PROGRESS NOTES
Verified pt by two identifiers. Allergies and medications reviewed. Removed bandage. Incision clean, dry, and intact. Advised pt to continue to take it easy. Advised pt to avoid baths and only shower using antibacterial soap. Pt verbalized understanding.

## 2019-01-31 ENCOUNTER — POSTPARTUM VISIT (OUTPATIENT)
Dept: OBSTETRICS AND GYNECOLOGY | Facility: CLINIC | Age: 39
End: 2019-01-31
Payer: COMMERCIAL

## 2019-01-31 VITALS
HEIGHT: 64 IN | DIASTOLIC BLOOD PRESSURE: 78 MMHG | SYSTOLIC BLOOD PRESSURE: 120 MMHG | BODY MASS INDEX: 34.92 KG/M2 | WEIGHT: 204.56 LBS

## 2019-01-31 DIAGNOSIS — Z98.891 STATUS POST REPEAT LOW TRANSVERSE CESAREAN SECTION: Primary | ICD-10-CM

## 2019-01-31 DIAGNOSIS — Z30.011 ENCOUNTER FOR INITIAL PRESCRIPTION OF CONTRACEPTIVE PILLS: ICD-10-CM

## 2019-01-31 DIAGNOSIS — Z86.32 HISTORY OF GESTATIONAL DIABETES: ICD-10-CM

## 2019-01-31 PROBLEM — O40.3XX0 POLYHYDRAMNIOS IN THIRD TRIMESTER: Status: RESOLVED | Noted: 2018-12-21 | Resolved: 2019-01-31

## 2019-01-31 PROBLEM — O24.415 GESTATIONAL DIABETES MELLITUS (GDM) IN THIRD TRIMESTER CONTROLLED ON ORAL HYPOGLYCEMIC DRUG: Status: RESOLVED | Noted: 2018-12-04 | Resolved: 2019-01-31

## 2019-01-31 PROBLEM — O34.219 HISTORY OF CESAREAN DELIVERY, CURRENTLY PREGNANT: Status: RESOLVED | Noted: 2018-05-08 | Resolved: 2019-01-31

## 2019-01-31 PROBLEM — O09.523 AMA (ADVANCED MATERNAL AGE) MULTIGRAVIDA 35+, THIRD TRIMESTER: Status: RESOLVED | Noted: 2018-05-08 | Resolved: 2019-01-31

## 2019-01-31 PROCEDURE — 99999 PR PBB SHADOW E&M-EST. PATIENT-LVL II: CPT | Mod: PBBFAC,,, | Performed by: OBSTETRICS & GYNECOLOGY

## 2019-01-31 PROCEDURE — 99999 PR PBB SHADOW E&M-EST. PATIENT-LVL II: ICD-10-PCS | Mod: PBBFAC,,, | Performed by: OBSTETRICS & GYNECOLOGY

## 2019-01-31 PROCEDURE — 0503F POSTPARTUM CARE VISIT: CPT | Mod: S$GLB,,, | Performed by: OBSTETRICS & GYNECOLOGY

## 2019-01-31 PROCEDURE — 0503F PR POSTPARTUM CARE VISIT: ICD-10-PCS | Mod: S$GLB,,, | Performed by: OBSTETRICS & GYNECOLOGY

## 2019-01-31 RX ORDER — ACETAMINOPHEN AND CODEINE PHOSPHATE 120; 12 MG/5ML; MG/5ML
1 SOLUTION ORAL DAILY
Qty: 28 TABLET | Refills: 6 | Status: SHIPPED | OUTPATIENT
Start: 2019-01-31 | End: 2021-05-06 | Stop reason: SDUPTHER

## 2019-01-31 NOTE — PROGRESS NOTES
"CC: Post-partum follow-up    Joshliamnicolasa Staci David is a 38 y.o. female  who presents for post-partum visit.  She is S/P a RLTCS.  She and the baby are doing well.  No pain.  No fever.   No bowel / bladder complaints.    Delivery Date: 2019  Delivery MD: Jony  Gender: male  Birth Weight: 7 pounds 9 ounces  Breast Feeding: YES  Depression: NO  Contraception: oral progesterone-only contraceptive    Pregnancy was complicated by:  GDM (on oral med), AMA, Prior C/S    /78   Ht 5' 4" (1.626 m)   Wt 92.8 kg (204 lb 9.4 oz)   LMP 03/10/2018   BMI 35.12 kg/m²     ROS:  GENERAL: No fever, chills, fatigability.  CARDIOVASCULAR: No chest pain. No shortness of breath. No leg cramps.  BREAST: Denies pain. No lumps. No discharge.  ABDOMEN: No abdominal pain. Denies nausea. Denies vomiting. No diarrhea. No constipation  URINARY: No incontinence, no nocturia, no frequency and no dysuria.  VULVAR: No pain, no lesions and no itching.  VAGINAL: No relaxation, no itching, no discharge, no abnormal bleeding and no lesions.  NEUROLOGICAL: No headaches. No vision changes.    PHYSICAL EXAM:  GENERAL: Alert and oriented in no distress  CHEST: Clear to auscultation  CV; Regular rate and rhythm  ABDOMEN:  Soft, non-tender, non-distended  WOUND: Healed well  VULVA:  Normal, no lesions  CERVIX:  Without lesions, polyps or tenderness.  UTERUS:  Normal size, shape, consistency, no mass or tenderness.  ADNEXA:  Normal in size without mass or tenderness  EXTREMITIES: Non-tender, Negative Jose Daniel's    IMP:  Doing well S/P RLTCS - Instructions / precautions reviewed  Contraceptive counseling - desires Micronor  GDM    PLAN:  May resume normal activities.     2 hr GTT at 12 wks PP.  Follow up with PCP.    Return: Annual exam  "

## 2019-03-25 ENCOUNTER — PATIENT OUTREACH (OUTPATIENT)
Dept: ADMINISTRATIVE | Facility: HOSPITAL | Age: 39
End: 2019-03-25

## 2019-10-09 ENCOUNTER — PATIENT MESSAGE (OUTPATIENT)
Dept: ADMINISTRATIVE | Facility: OTHER | Age: 39
End: 2019-10-09

## 2019-10-10 DIAGNOSIS — Z13.79 GENETIC SCREENING: ICD-10-CM

## 2019-12-06 ENCOUNTER — PATIENT MESSAGE (OUTPATIENT)
Dept: OBSTETRICS AND GYNECOLOGY | Facility: CLINIC | Age: 39
End: 2019-12-06

## 2020-02-04 ENCOUNTER — PATIENT MESSAGE (OUTPATIENT)
Dept: OBSTETRICS AND GYNECOLOGY | Facility: CLINIC | Age: 40
End: 2020-02-04

## 2020-02-06 ENCOUNTER — OFFICE VISIT (OUTPATIENT)
Dept: OBSTETRICS AND GYNECOLOGY | Facility: CLINIC | Age: 40
End: 2020-02-06
Payer: COMMERCIAL

## 2020-02-06 VITALS
SYSTOLIC BLOOD PRESSURE: 122 MMHG | BODY MASS INDEX: 35.12 KG/M2 | DIASTOLIC BLOOD PRESSURE: 80 MMHG | HEIGHT: 64 IN | WEIGHT: 205.69 LBS

## 2020-02-06 DIAGNOSIS — Z12.4 PAP SMEAR FOR CERVICAL CANCER SCREENING: Primary | ICD-10-CM

## 2020-02-06 DIAGNOSIS — Z30.44 ENCOUNTER FOR SURVEILLANCE OF VAGINAL RING HORMONAL CONTRACEPTIVE DEVICE: ICD-10-CM

## 2020-02-06 PROCEDURE — 99999 PR PBB SHADOW E&M-EST. PATIENT-LVL II: ICD-10-PCS | Mod: PBBFAC,,, | Performed by: OBSTETRICS & GYNECOLOGY

## 2020-02-06 PROCEDURE — 88141 CYTOPATH C/V INTERPRET: CPT | Mod: ,,, | Performed by: PATHOLOGY

## 2020-02-06 PROCEDURE — 99395 PREV VISIT EST AGE 18-39: CPT | Mod: S$GLB,,, | Performed by: OBSTETRICS & GYNECOLOGY

## 2020-02-06 PROCEDURE — 87624 HPV HI-RISK TYP POOLED RSLT: CPT

## 2020-02-06 PROCEDURE — 88175 CYTOPATH C/V AUTO FLUID REDO: CPT | Performed by: PATHOLOGY

## 2020-02-06 PROCEDURE — 88141 PR  CYTOPATH CERV/VAG INTERPRET: ICD-10-PCS | Mod: ,,, | Performed by: PATHOLOGY

## 2020-02-06 PROCEDURE — 99395 PR PREVENTIVE VISIT,EST,18-39: ICD-10-PCS | Mod: S$GLB,,, | Performed by: OBSTETRICS & GYNECOLOGY

## 2020-02-06 PROCEDURE — 99999 PR PBB SHADOW E&M-EST. PATIENT-LVL II: CPT | Mod: PBBFAC,,, | Performed by: OBSTETRICS & GYNECOLOGY

## 2020-02-06 RX ORDER — ETONOGESTREL AND ETHINYL ESTRADIOL VAGINAL RING .015; .12 MG/D; MG/D
1 RING VAGINAL
Qty: 1 EACH | Refills: 11 | Status: SHIPPED | OUTPATIENT
Start: 2020-02-06 | End: 2021-01-24 | Stop reason: SDUPTHER

## 2020-02-06 NOTE — PROGRESS NOTES
Subjective:       Patient ID: Micaela David is a 39 y.o. female.    Chief Complaint:  Contraception      History of Present Illness  HPI  Annual Exam-Premenopausal  Patient presents for annual exam. The patient has no complaints today. The patient is sexually active. GYN screening history: last pap: approximate date  and was normal. The patient wears seatbelts: yes. The patient participates in regular exercise: yes. Has the patient ever been transfused or tattooed?: yes. The patient reports that there is not domestic violence in her life.  Menses are regular.  Denies excessive bleeding or cramping.  Pt would like to discuss contraception options.  Has stopped breastfeeding.        GYN & OB History  Patient's last menstrual period was 2020.   Date of Last Pap: 2017    OB History    Para Term  AB Living   5 3 3   2 3   SAB TAB Ectopic Multiple Live Births         0 3      # Outcome Date GA Lbr Elbert/2nd Weight Sex Delivery Anes PTL Lv   5 Term 19 39w1d  3.43 kg (7 lb 9 oz) M CS-LTranv Spinal N JEIMY   4 AB  4w0d          3 Term    3.515 kg (7 lb 12 oz) F CS-Unspec   JEIMY   2 Term    3.572 kg (7 lb 14 oz) F CS-Unspec   JEIMY   1 AB                Review of Systems  Review of Systems   Constitutional: Negative for activity change, appetite change, chills, fatigue, fever and unexpected weight change.   Respiratory: Negative for shortness of breath.    Cardiovascular: Negative for chest pain, palpitations and leg swelling.   Gastrointestinal: Negative for abdominal pain, bloating, blood in stool, constipation, diarrhea, nausea and vomiting.   Genitourinary: Negative for dysmenorrhea, dyspareunia, dysuria, flank pain, frequency, genital sores, hematuria, menorrhagia, menstrual problem, pelvic pain, urgency, vaginal bleeding, vaginal discharge, vaginal pain, urinary incontinence, postcoital bleeding, vaginal dryness and vaginal odor.   Musculoskeletal: Negative for back pain.    Integumentary:  Negative for breast mass, nipple discharge, breast skin changes and breast tenderness.   Neurological: Negative for syncope and headaches.   Breast: Negative for asymmetry, lump, mass, mastodynia, nipple discharge, skin changes and tenderness          Objective:    Physical Exam:   Constitutional: She is oriented to person, place, and time. She appears well-developed and well-nourished. No distress.    HENT:   Head: Normocephalic and atraumatic.    Eyes: Pupils are equal, round, and reactive to light. EOM are normal.    Neck: Normal range of motion.    Cardiovascular: Normal rate, regular rhythm and normal heart sounds.     Pulmonary/Chest: Effort normal and breath sounds normal.        Abdominal: Soft. Bowel sounds are normal. She exhibits no distension. There is no tenderness.     Genitourinary: Vagina normal and uterus normal. Pelvic exam was performed with patient supine. There is no rash, tenderness, lesion or injury on the right labia. There is no rash, tenderness, lesion or injury on the left labia. Uterus is not deviated, not enlarged and not tender. Cervix is normal. Right adnexum displays no mass, no tenderness and no fullness. Left adnexum displays no mass, no tenderness and no fullness. No erythema, tenderness or bleeding in the vagina. No foreign body in the vagina. No signs of injury around the vagina. No vaginal discharge found. Cervix exhibits no motion tenderness, no discharge and no friability. Additional cervical findings: pap smear done          Musculoskeletal: Normal range of motion and moves all extremeties. She exhibits no edema or tenderness.       Neurological: She is alert and oriented to person, place, and time.    Skin: Skin is warm and dry.    Psychiatric: She has a normal mood and affect. Her behavior is normal. Thought content normal.          Assessment:        1. Pap smear for cervical cancer screening    2. Encounter for surveillance of vaginal ring hormonal  contraceptive device              Plan:      Pap smear for cervical cancer screening  -     Liquid-Based Pap Smear, Screening  -     HPV High Risk Genotypes, PCR  -     Pt was counseled on cervical/vaginal screening guidelines and recommendations.  Last pap NILM on 2017.  If today's pap smear result is negative, next pap smear will be due in 2023.  -     Follow up with PCP for routine health maintenance needs.    Encounter for surveillance of vaginal ring hormonal contraceptive device  -     etonogestrel-ethinyl estradiol (NUVARING) 0.12-0.015 mg/24 hr vaginal ring; Place 1 each vaginally every 21 days. Insert one (1) ring vaginally and leave in place for three (3) weeks, then remove for one (1) week.  Dispense: 1 each; Refill: 11  -     Pt was counseled on contraception options, including associated risks and benefits of each.  Pt voiced understanding and desires to proceed with Nuvaring.  Medication dosing, side-effects, risks, benefits, and alternatives were discussed.  Medical history was reviewed and pt is a candidate for Nuvaring use.      Follow up in about 1 year (around 2/6/2021).

## 2020-02-14 LAB
HPV HR 12 DNA SPEC QL NAA+PROBE: NEGATIVE
HPV16 AG SPEC QL: NEGATIVE
HPV18 DNA SPEC QL NAA+PROBE: NEGATIVE

## 2020-03-05 LAB
FINAL PATHOLOGIC DIAGNOSIS: ABNORMAL
Lab: ABNORMAL

## 2020-06-26 ENCOUNTER — OFFICE VISIT (OUTPATIENT)
Dept: FAMILY MEDICINE | Facility: CLINIC | Age: 40
End: 2020-06-26
Payer: COMMERCIAL

## 2020-06-26 ENCOUNTER — HOSPITAL ENCOUNTER (OUTPATIENT)
Dept: RADIOLOGY | Facility: HOSPITAL | Age: 40
Discharge: HOME | End: 2020-06-26
Attending: FAMILY MEDICINE
Payer: COMMERCIAL

## 2020-06-26 VITALS
HEART RATE: 98 BPM | TEMPERATURE: 99 F | BODY MASS INDEX: 33.26 KG/M2 | WEIGHT: 193.81 LBS | OXYGEN SATURATION: 99 % | RESPIRATION RATE: 18 BRPM | DIASTOLIC BLOOD PRESSURE: 82 MMHG | SYSTOLIC BLOOD PRESSURE: 136 MMHG

## 2020-06-26 DIAGNOSIS — M25.511 RIGHT ANTERIOR SHOULDER PAIN: ICD-10-CM

## 2020-06-26 DIAGNOSIS — M79.18 MYOFASCIAL PAIN SYNDROME OF THORACIC SPINE: Primary | ICD-10-CM

## 2020-06-26 DIAGNOSIS — G89.29 CHRONIC RIGHT SHOULDER PAIN: ICD-10-CM

## 2020-06-26 DIAGNOSIS — M25.511 CHRONIC RIGHT SHOULDER PAIN: ICD-10-CM

## 2020-06-26 PROCEDURE — 20552 NJX 1/MLT TRIGGER POINT 1/2: CPT | Mod: S$GLB,,, | Performed by: FAMILY MEDICINE

## 2020-06-26 PROCEDURE — 3008F PR BODY MASS INDEX (BMI) DOCUMENTED: ICD-10-PCS | Mod: CPTII,S$GLB,, | Performed by: FAMILY MEDICINE

## 2020-06-26 PROCEDURE — 99204 OFFICE O/P NEW MOD 45 MIN: CPT | Mod: 25,S$GLB,, | Performed by: FAMILY MEDICINE

## 2020-06-26 PROCEDURE — 99999 PR PBB SHADOW E&M-EST. PATIENT-LVL IV: ICD-10-PCS | Mod: PBBFAC,,, | Performed by: FAMILY MEDICINE

## 2020-06-26 PROCEDURE — 3008F BODY MASS INDEX DOCD: CPT | Mod: CPTII,S$GLB,, | Performed by: FAMILY MEDICINE

## 2020-06-26 PROCEDURE — 20552 TRIGGER POINT INJECTION: ICD-10-PCS | Mod: S$GLB,,, | Performed by: FAMILY MEDICINE

## 2020-06-26 PROCEDURE — 73030 X-RAY EXAM OF SHOULDER: CPT | Mod: 26,RT,, | Performed by: RADIOLOGY

## 2020-06-26 PROCEDURE — 73030 X-RAY EXAM OF SHOULDER: CPT | Mod: TC,FY,PO,RT

## 2020-06-26 PROCEDURE — 99204 PR OFFICE/OUTPT VISIT, NEW, LEVL IV, 45-59 MIN: ICD-10-PCS | Mod: 25,S$GLB,, | Performed by: FAMILY MEDICINE

## 2020-06-26 PROCEDURE — 73030 XR SHOULDER COMPLETE 2 OR MORE VIEWS RIGHT: ICD-10-PCS | Mod: 26,RT,, | Performed by: RADIOLOGY

## 2020-06-26 PROCEDURE — 99999 PR PBB SHADOW E&M-EST. PATIENT-LVL IV: CPT | Mod: PBBFAC,,, | Performed by: FAMILY MEDICINE

## 2020-06-26 RX ORDER — TRIAMCINOLONE ACETONIDE 40 MG/ML
40 INJECTION, SUSPENSION INTRA-ARTICULAR; INTRAMUSCULAR
Status: DISCONTINUED | OUTPATIENT
Start: 2020-06-26 | End: 2020-06-26 | Stop reason: HOSPADM

## 2020-06-26 RX ORDER — MELOXICAM 15 MG/1
15 TABLET ORAL DAILY
Qty: 30 TABLET | Refills: 0 | Status: SHIPPED | OUTPATIENT
Start: 2020-06-26 | End: 2020-07-24

## 2020-06-26 RX ORDER — METHOCARBAMOL 750 MG/1
750 TABLET, FILM COATED ORAL 3 TIMES DAILY PRN
Qty: 40 TABLET | Refills: 0 | Status: SHIPPED | OUTPATIENT
Start: 2020-06-26 | End: 2020-07-06

## 2020-06-26 RX ADMIN — TRIAMCINOLONE ACETONIDE 40 MG: 40 INJECTION, SUSPENSION INTRA-ARTICULAR; INTRAMUSCULAR at 04:06

## 2020-06-26 NOTE — PROCEDURES
Trigger Point Injection  Performed by: Doris Jerry MD  Authorized by: Doris Jerry MD     Trapezus:  Right  Consent Done?:  Yes (Written)  Pre-Procedure:     Indications:  Pain relief  Site marked: the procedure site was marked    Timeout: prior to procedure the correct patient, procedure, and site was verified        Local anesthesia used?: Yes      Local anesthetic:  Lidocaine spray   40 mg triamcinolone acetonide 40 mg/mL

## 2020-06-26 NOTE — PATIENT INSTRUCTIONS
Muscle Strain in the Extremities  A muscle strain is a stretching and tearing of muscle fibers. This causes pain, especially when you move that muscle. There may also be some swelling and bruising.  Home care  · Keep the hurt area raised to reduce pain and swelling. This is especially important during the first 48 hours.  · Apply an ice pack over the injured area for 15 to 20 minutes every 3 to 6 hours. You should do this for the first 24 to 48 hours. You can make an ice pack by filling a plastic bag that seals at the top with ice cubes and then wrapping it with a thin towel. Be careful not to injure your skin with the ice treatments. Ice should never be applied directly to skin. Continue the use of ice packs for relief of pain and swelling as needed. After 48 hours, apply heat (warm shower or warm bath) for 15 to 20 minutes several times a day, or alternate ice and heat.  · You may use over-the-counter pain medicine to control pain, unless another medicine was prescribed. If you have chronic liver or kidney disease or ever had a stomach ulcer or GI bleeding, talk with your healthcare provider before using these medicines.  · For leg strains: If crutches have been recommended, dont put full weight on the hurt leg until you can do so without pain. You can return to sports when you are able to hop and run on the injured leg without pain.  Follow-up care  Follow up with your healthcare provider, or as advised.  When to seek medical advice  Call your healthcare provider right away if any of these occur:  · The toes of the injured leg become swollen, cold, blue, numb, or tingly  · Pain or swelling increases  Date Last Reviewed: 11/19/2015  © 2453-4597 [employer]. [address] [place] [address], [place] [address]. All rights reserved. This information is not intended as a substitute for professional medical care. Always follow your healthcare professional's instructions.

## 2020-06-26 NOTE — PROGRESS NOTES
Subjective:       Patient ID: Micaela David is a 40 y.o. female.    Chief Complaint: Arm Pain      History of Present Illness:   Micaela David 40 y.o. female presents today with   Shoulder Pain: Patient complaints of right shoulder pain x more than 3 months. This is evaluated as a none injury. The pain is described as throbbing.  The onset of the pain was gradual onset, started from the shoudler blade and worked itself to the base of the neck and top of the shoulder. Reports that it is all muscular.  The pain occurs continuously and lasts 24 hours.  Location is posterior. No history of dislocation. Symptoms are aggravated by sitting with arm hanging down, working on her desk. Symptoms are diminished by Rest, mild fat and stretching. She is a  and works on the computer all the time. 7/10. Tried Ibuprofen.    Past Medical History:   Diagnosis Date    Asthma      Family History   Problem Relation Age of Onset    Colon cancer Maternal Grandfather     Ovarian cancer Maternal Aunt     Diabetes Mother     Hypertension Mother     Diabetes Father     Hypertension Father     Breast cancer Neg Hx      Social History     Socioeconomic History    Marital status: Single     Spouse name: Not on file    Number of children: Not on file    Years of education: Not on file    Highest education level: Not on file   Occupational History    Not on file   Social Needs    Financial resource strain: Not on file    Food insecurity     Worry: Not on file     Inability: Not on file    Transportation needs     Medical: Not on file     Non-medical: Not on file   Tobacco Use    Smoking status: Former Smoker     Types: Vaping with nicotine    Smokeless tobacco: Never Used    Tobacco comment: 3 per day    Substance and Sexual Activity    Alcohol use: No     Alcohol/week: 0.0 standard drinks     Comment: socially     Drug use: No    Sexual activity: Yes     Partners: Male     Birth  control/protection: None, Implant   Lifestyle    Physical activity     Days per week: Not on file     Minutes per session: Not on file    Stress: Not on file   Relationships    Social connections     Talks on phone: Not on file     Gets together: Not on file     Attends Yazidism service: Not on file     Active member of club or organization: Not on file     Attends meetings of clubs or organizations: Not on file     Relationship status: Not on file   Other Topics Concern    Not on file   Social History Narrative    Not on file     Outpatient Encounter Medications as of 6/26/2020   Medication Sig Dispense Refill    etonogestrel-ethinyl estradiol (NUVARING) 0.12-0.015 mg/24 hr vaginal ring Place 1 each vaginally every 21 days. Insert one (1) ring vaginally and leave in place for three (3) weeks, then remove for one (1) week. 1 each 11    meloxicam (MOBIC) 15 MG tablet Take 1 tablet (15 mg total) by mouth once daily. 30 tablet 0    methocarbamoL (ROBAXIN) 750 MG Tab Take 1 tablet (750 mg total) by mouth 3 (three) times daily as needed. 40 tablet 0    norethindrone (ORTHO MICRONOR) 0.35 mg tablet Take 1 tablet (0.35 mg total) by mouth once daily. (Patient not taking: Reported on 6/26/2020) 28 tablet 6    prenatal vit-iron fum-folic ac 65 mg iron- 1 mg Cap Take 1 tablet by mouth once daily. (Patient not taking: Reported on 6/26/2020) 90 each 3    [DISCONTINUED] ibuprofen (ADVIL,MOTRIN) 800 MG tablet Take 1 tablet (800 mg total) by mouth every 8 (eight) hours. (Patient not taking: Reported on 6/26/2020) 60 tablet 0     Facility-Administered Encounter Medications as of 6/26/2020   Medication Dose Route Frequency Provider Last Rate Last Dose    triamcinolone acetonide injection 40 mg  40 mg Intra-articular  Doris Jerry MD   40 mg at 06/26/20 0825       Review of Systems   Constitutional: Negative for chills and fever.   HENT: Negative for congestion and facial swelling.    Eyes: Negative for discharge and  itching.   Respiratory: Negative for cough and wheezing.    Cardiovascular: Negative for chest pain and palpitations.   Gastrointestinal: Negative for abdominal pain, nausea and vomiting.   Endocrine: Negative for cold intolerance and heat intolerance.   Genitourinary: Negative for dysuria and flank pain.   Musculoskeletal: Positive for back pain. Negative for myalgias and neck stiffness.   Skin: Negative for pallor and wound.   Neurological: Negative for facial asymmetry and weakness.   Psychiatric/Behavioral: Negative for agitation and suicidal ideas.       Objective:      /82 (BP Location: Right arm, Patient Position: Sitting, BP Method: Large (Manual))   Pulse 98   Temp 98.6 °F (37 °C) (Oral)   Resp 18   Wt 87.9 kg (193 lb 12.6 oz)   LMP 06/05/2020   SpO2 99%   BMI 33.26 kg/m²   Physical Exam  Vitals signs and nursing note reviewed.   Constitutional:       General: She is not in acute distress.     Appearance: She is well-developed.   HENT:      Head: Normocephalic and atraumatic.      Right Ear: External ear normal.      Left Ear: External ear normal.   Eyes:      Conjunctiva/sclera: Conjunctivae normal.   Neck:      Musculoskeletal: Neck supple.      Thyroid: No thyromegaly.   Cardiovascular:      Rate and Rhythm: Normal rate and regular rhythm.   Pulmonary:      Effort: Pulmonary effort is normal. No respiratory distress.   Abdominal:      General: There is no distension.      Palpations: Abdomen is soft.   Genitourinary:     Comments: deferred  Musculoskeletal:         General: No deformity.      Right shoulder: She exhibits tenderness and decreased strength. She exhibits normal range of motion, no bony tenderness, no swelling, no effusion, no crepitus, no deformity, no laceration and no pain.      Cervical back: She exhibits tenderness and spasm.      Thoracic back: She exhibits tenderness and spasm.        Back:       Comments: Painful arch is positive at 60   Impingement sign is negative    Lymphadenopathy:      Head:      Right side of head: No submandibular adenopathy.      Left side of head: No submandibular adenopathy.      Cervical: No cervical adenopathy.   Skin:     General: Skin is warm and dry.   Neurological:      Mental Status: She is alert and oriented to person, place, and time.   Psychiatric:         Behavior: Behavior normal.         Lab Results   Component Value Date    WBC 9.85 01/02/2019    HGB 12.9 01/02/2019    HCT 38.8 01/02/2019    MCV 85 01/02/2019     01/02/2019     CMP  Sodium   Date Value Ref Range Status   01/02/2019 136 136 - 145 mmol/L Final     Potassium   Date Value Ref Range Status   01/02/2019 3.9 3.5 - 5.1 mmol/L Final     Chloride   Date Value Ref Range Status   01/02/2019 107 95 - 110 mmol/L Final     CO2   Date Value Ref Range Status   01/02/2019 19 (L) 23 - 29 mmol/L Final     Glucose   Date Value Ref Range Status   01/02/2019 77 70 - 110 mg/dL Final     BUN, Bld   Date Value Ref Range Status   01/02/2019 8 6 - 20 mg/dL Final     Creatinine   Date Value Ref Range Status   01/02/2019 0.7 0.5 - 1.4 mg/dL Final     Calcium   Date Value Ref Range Status   01/02/2019 9.1 8.7 - 10.5 mg/dL Final     Total Protein   Date Value Ref Range Status   01/02/2019 6.2 6.0 - 8.4 g/dL Final     Albumin   Date Value Ref Range Status   01/02/2019 2.8 (L) 3.5 - 5.2 g/dL Final     Total Bilirubin   Date Value Ref Range Status   01/02/2019 0.4 0.1 - 1.0 mg/dL Final     Comment:     For infants and newborns, interpretation of results should be based  on gestational age, weight and in agreement with clinical  observations.  Premature Infant recommended reference ranges:  Up to 24 hours.............<8.0 mg/dL  Up to 48 hours............<12.0 mg/dL  3-5 days..................<15.0 mg/dL  6-29 days.................<15.0 mg/dL       Alkaline Phosphatase   Date Value Ref Range Status   01/02/2019 140 (H) 55 - 135 U/L Final     AST   Date Value Ref Range Status   01/02/2019 21 10 - 40  U/L Final     ALT   Date Value Ref Range Status   01/02/2019 9 (L) 10 - 44 U/L Final     Anion Gap   Date Value Ref Range Status   01/02/2019 10 8 - 16 mmol/L Final     eGFR if    Date Value Ref Range Status   01/02/2019 >60 >60 mL/min/1.73 m^2 Final     eGFR if non    Date Value Ref Range Status   01/02/2019 >60 >60 mL/min/1.73 m^2 Final     Comment:     Calculation used to obtain the estimated glomerular filtration  rate (eGFR) is the CKD-EPI equation.          Assessment:       1. Myofascial pain syndrome of thoracic spine    2. Chronic right shoulder pain        Plan:   Right shoulder pain  -     X-ray Shoulder 2 or More Views Right; Future; Expected date: 06/26/2020-I reviewed the xray of the shoulder, no acute process noted  -     methocarbamoL (ROBAXIN) 750 MG Tab; Take 1 tablet (750 mg total) by mouth 3 (three) times daily as needed.  Dispense: 40 tablet; Refill: 0  -     meloxicam (MOBIC) 15 MG tablet; Take 1 tablet (15 mg total) by mouth once daily.  Dispense: 30 tablet; Refill: 0  -     Ambulatory referral/consult to Physical/Occupational Therapy; Future; Expected date: 07/03/2020    Myofascial pain syndrome of thoracic spine

## 2020-07-06 ENCOUNTER — CLINICAL SUPPORT (OUTPATIENT)
Dept: REHABILITATION | Facility: HOSPITAL | Age: 40
End: 2020-07-06
Attending: FAMILY MEDICINE
Payer: COMMERCIAL

## 2020-07-06 DIAGNOSIS — R29.898 SHOULDER WEAKNESS: ICD-10-CM

## 2020-07-06 DIAGNOSIS — M25.511 CHRONIC RIGHT SHOULDER PAIN: ICD-10-CM

## 2020-07-06 DIAGNOSIS — G89.29 CHRONIC RIGHT SHOULDER PAIN: ICD-10-CM

## 2020-07-06 PROCEDURE — 97110 THERAPEUTIC EXERCISES: CPT

## 2020-07-06 PROCEDURE — 97161 PT EVAL LOW COMPLEX 20 MIN: CPT

## 2020-07-06 NOTE — PROGRESS NOTES
[facility]  Physical Therapy Initial Evaluation    Name: Micaela David  Clinic Number: [id number]    Therapy Diagnosis:   Encounter Diagnoses   Name Primary?    Chronic right shoulder pain     Shoulder weakness      Physician: Doris Jerry MD    Physician Orders: PT Eval and Treat   Medical Diagnosis from Referral: M25.511,G89.29 (ICD-10-CM) - Chronic right shoulder pain  Evaluation Date: 7/6/2020  Authorization Period Expiration: 12/31/20  Plan of Care Expiration: 8/6/20  Visit # / Visits authorized: 1/ 20    Time In: 11:15  Time Out:12:00  Total Billable Time: 8 minutes     Precautions: Standard    Subjective   Date of onset: 1 month ago  History of current condition - Micaela reports: waking up one morning and having pain around shoulder and outer shoulder blade.  Pt. Reports resting shoulder and began having radiating pain down R LE.  Pt. Reports that muscle relaxer helped.  Pt. Reports difficulty picking up her 18 month old.       Pain:  Current 3/10,   Location: right shoulder   Description: Shooting, throbbing pain under shoulder blade  Aggravating Factors: lying on R side, lifting, using computer causes pain in elbow and forearm.   Easing Factors: stretching out to side, internal rotation reach behind back    Prior Therapy: none  Social History: live with your family  Occupation: work from home on computer  Prior Level of Function: Independent  Current Level of Function: limited with lifting, lying on R side, computer work    Imaging x-ray 6/26/20FINDINGS:  AC joint arthrosis with narrowing along the inferior joint space with mild spurring.  No acute fracture is present.  On the frontal projection, there is some loss of normal overlap between the humeral head and  the glenoid which may be positional and/or secondary to laxity as on the Y-view no dislocation is seen.  Correlation with history/symptomatology is suggested.      Medical History:   Past Medical  History:   Diagnosis Date    Asthma        Surgical History:   Micaela David  has a past surgical history that includes  section and  section (N/A, 2019).    Medications:   Micaela has a current medication list which includes the following prescription(s): etonogestrel-ethinyl estradiol, meloxicam, methocarbamol, norethindrone, and prenatal vit-iron fum-folic ac.    Allergies:   Review of patient's allergies indicates:  No Known Allergies     Pts goals: be able to lift again without radiating pain to back    Objective       CMS Impairment/Limitation/Restriction for FOTO Shoulder Survey    Therapist reviewed FOTO scores for Micaela David on 2020.   FOTO documents entered into [employer] - see Media section.    Limitation Score: 59%  Category: Mobility    Current : CK = at least 40% but < 60% impaired, limited or restricted  Goal: CK = at least 40% but < 60% impaired, limited or restricted  Discharge: CK = at least 40% but < 60% impaired, limited or restricted         Posture: Pt noted to present with forward head/rounded shoulder posture.    Scapular AROM standing     Shoulder ROM:   Active/Passive Joint Range Right Left   Flexion WNL WNL   ABDuction WNL WNL   External Rotation WNL WNL   Internal Rotation WNL WNL   Extension WNL WNL     Cervical Spine AROM:   % Pain   FB WNL Mild radicular    BB WNL Mild radicular   RSB WNL    LSB WNL    RR WNL    LR WNL      Strength:  Muscle (Myotome) Right Left   Shoulder Flex 3+/5 3+/5   Shoulder Abduction 3+/5 3+/5    Middle trapezius 4/5 3+/5   Lower trapezius 4/5 3+/5   Rhomboids 4/5 4/5     Sensation: Intact B UE    Special Test:  Mir R neg.  Neers  R +     Empty Can R neg.   AC crossover R neg.      Lift off  R +    Joint Mobility: post. Shoulder pain with R AC joint mob, R GH joint WNL all mobilizations,      Upper Limb Tension Test: + R radial, + R median, + R ulnar nerve at end range, + distraction with relief of  symptoms in shoulder blade but discomfort in R UE        TREATMENT   Treatment Time In: 11:45  Treatment Time Out: 12:00  Total Treatment time separate from Evaluation: 8 minutes    Micaela received therapeutic exercises to develop strength and endurance for 8 minutes including: chin tucks x 3 min., DNF x 3 min. strengthening x 2 min., upper trapezius strengthening at wall        Home Exercises and Patient Education Provided    Education provided:   -Education on condition, HEP, and activity    Written Home Exercises Provided: yes.  Exercises were reviewed and Micaela was able to demonstrate them prior to the end of the session.  Micaela demonstrated good  understanding of the education provided.     See EMR under Patient Instructions for exercises provided 7/6/2020.    Assessment   Micaela is a 40 y.o. female referred to outpatient Physical Therapy with a medical diagnosis of Acute Pain of R Shoulder. Pt presents with decrease cervical extensor and flexor strength, + upper limb tension in R median, ulnar, and radial nerves, mild to moderate R shoulder weakness, tension in R prox. Cervical paraspinals.    Pt prognosis is Excellent.   Pt will benefit from skilled outpatient Physical Therapy to address the deficits stated above and in the chart below, provide pt/family education, and to maximize pt's level of independence.     Plan of care discussed with patient: Yes  Pt's spiritual, cultural and educational needs considered and patient is agreeable to the plan of care and goals as stated below:     Anticipated Barriers for therapy: none    Medical Necessity is demonstrated by the following  History  Co-morbidities and personal factors that may impact the plan of care Co-morbidities:   none    Personal Factors:   no deficits     low   Examination  Body Structures and Functions, activity limitations and participation restrictions that may impact the plan of care Body Regions:   neck  upper extremities    Body  Systems:    strength  motor control  motor learning    Participation Restrictions:   lifting    Activity limitations:   Learning and applying knowledge  no deficits    General Tasks and Commands  no deficits    Communication  no deficits    Mobility  lifting and carrying objects    Self care  no deficits    Domestic Life  assisting others    Interactions/Relationships  no deficits    Life Areas  no deficits    Community and Social Life  community life  recreation and leisure         low   Clinical Presentation stable and uncomplicated low   Decision Making/ Complexity Score: low     Goals:  Short Term Goals: In 4 weeks   1.I with HEP  2. Pt. Increase DNF endurance to 30 sec. Hold to maintain proper posture at computer.  3.Patient to increase R shoulder girdle  strength 5/5 gross MMT.  4.Patient to have pain 0/10 R UE at all times.  5.Patient to score less than % impaired on the UEFS    Long Term Goals: In 8 weeks  1. Patient to increase cervical strength to 5/5 gross MMT to tolerate proper posture with sitting activities.  2. Patient to demo increase in UE strength to 5/5 gross to increase R shoulder stability to reach for and lift objects without discomfort.  3. Pt. To have neg neural bias in R upper limb to tolerate typing at computer without discomfort.    Plan   Plan of care Certification: 7/6/2020 to 8/6/20    Outpatient Physical Therapy 2 times weekly for 6- 8 weeks to include the following interventions: Manual Therapy, Moist Heat/ Ice, Neuromuscular Re-ed, Patient Education, Self Care, Therapeutic Activites and Therapeutic Exercise, e-stim.     Laura Villela, PT    Thank you for this referral.    These services are reasonable and necessary for the conditions set forth above while under my care.

## 2020-07-13 ENCOUNTER — CLINICAL SUPPORT (OUTPATIENT)
Dept: REHABILITATION | Facility: HOSPITAL | Age: 40
End: 2020-07-13
Payer: COMMERCIAL

## 2020-07-13 DIAGNOSIS — M25.511 CHRONIC RIGHT SHOULDER PAIN: Primary | ICD-10-CM

## 2020-07-13 DIAGNOSIS — R29.898 SHOULDER WEAKNESS: ICD-10-CM

## 2020-07-13 DIAGNOSIS — G89.29 CHRONIC RIGHT SHOULDER PAIN: Primary | ICD-10-CM

## 2020-07-13 PROCEDURE — 97140 MANUAL THERAPY 1/> REGIONS: CPT

## 2020-07-13 PROCEDURE — 97110 THERAPEUTIC EXERCISES: CPT

## 2020-07-13 NOTE — PROGRESS NOTES
Physical Therapy Treatment Note     [facility]  Clinic Number: [id number]    Therapy Diagnosis:   Encounter Diagnoses   Name Primary?    Chronic right shoulder pain Yes    Shoulder weakness      Physician: Doris Jerry MD    Visit Date: 7/13/2020  Physician Orders: PT Eval and Treat   Medical Diagnosis from Referral: M25.511,G89.29 (ICD-10-CM) - Chronic right shoulder pain  Evaluation Date: 7/6/2020  Authorization Period Expiration: 12/31/20  Plan of Care Expiration: 8/6/20  Visit # / Visits authorized: 2/ 20      Time In: 12:00  Time Out: 12:50  Total Billable Time: 26 minutes    Precautions: Standard    Subjective     Pt reports: that her neck felt better but pain in her R shoulder and elbow.  Pt. Reports tingling in elbow.   She was compliant with home exercise program.  Response to previous treatment: less pain  Functional change: more comfort at computer and sleeping    Pain: 6/10  Location: right arms     Objective   Micaela received therapeutic exercises to develop strength and endurance for 20 minutes including: UBE x 4 min.,  DNF x 2 min., prone chin tucks x 2 min., shoulder shrugs x 2 min.,  strengthening x 2 min., upper trapezius strengthening at wall x 2 min., lower trap standing x 2 min., middle trap standing x 2 min., scap retraction x 2 min.       Micaela received the following manual therapy techniques: Joint mobilizations, Manual traction and Soft tissue Mobilization were applied to the: for 23 minutes, including: manual cervical traction x 3 min., R FMP cervical paraspinals x 2 min., neural mobilization to R radial nerve x 6 min., R median nerve glides x 2 min., R radial nerve glides x 2min.,STM R cervical paraspinals x 8 min.       Micaela received hot pack for 10 minutes to R forearm and R cervical region.          Home Exercises Provided and Patient Education Provided     Education provided:   - Updated HEP    Written Home Exercises Provided:  yes.  Exercises were reviewed and Micaela was able to demonstrate them prior to the end of the session.  Micaela demonstrated good  understanding of the education provided.     See EMR under Patient Instructions for exercises provided 7/13/2020.    Assessment     Pt. Noted to have hypomobility with OA nod and lower cervical vertebrae.  Pt. Still has slight tension in R cervical paraspinals.  Pt. Has cont. R median and R radial nerve bias.  Pt. Seemed to get relief from neural mobilization of R radial and R median nerves.  Pt. Advanced to further scapular strengthening to help increase oxygenation to nerve tissues and forward shoulder posture to reduce compression of nerves.  Cont. POC.  Micaela is progressing well towards her goals.   Pt prognosis is Excellent.     Pt will continue to benefit from skilled outpatient physical therapy to address the deficits listed in the problem list box on initial evaluation, provide pt/family education and to maximize pt's level of independence in the home and community environment.     Pt's spiritual, cultural and educational needs considered and pt agreeable to plan of care and goals.     Anticipated barriers to physical therapy: none    Goals:  Short Term Goals: In 4 weeks   1.I with HEP  2. Pt. Increase DNF endurance to 30 sec. Hold to maintain proper posture at computer.  3.Patient to increase R shoulder girdle  strength 5/5 gross MMT.  4.Patient to have pain 0/10 R UE at all times.  5.Patient to score less than % impaired on the UEFS     Long Term Goals: In 8 weeks  1. Patient to increase cervical strength to 5/5 gross MMT to tolerate proper posture with sitting activities.  2. Patient to demo increase in UE strength to 5/5 gross to increase R shoulder stability to reach for and lift objects without discomfort.  3. Pt. To have neg neural bias in R upper limb to tolerate typing at computer without discomfort.    Plan     Plan of care Certification: 7/6/2020 to  8/6/20     Outpatient Physical Therapy 2 times weekly for 6- 8 weeks to include the following interventions: Manual Therapy, Moist Heat/ Ice, Neuromuscular Re-ed, Patient Education, Self Care, Therapeutic Activites and Therapeutic Exercise, e-stim.        Thank you for this referral.     These services are reasonable and necessary for the conditions set forth above while under my care.  Laura Villela, PT

## 2020-07-20 ENCOUNTER — CLINICAL SUPPORT (OUTPATIENT)
Dept: REHABILITATION | Facility: HOSPITAL | Age: 40
End: 2020-07-20
Payer: COMMERCIAL

## 2020-07-20 DIAGNOSIS — R29.898 SHOULDER WEAKNESS: ICD-10-CM

## 2020-07-20 DIAGNOSIS — M25.511 CHRONIC RIGHT SHOULDER PAIN: Primary | ICD-10-CM

## 2020-07-20 DIAGNOSIS — G89.29 CHRONIC RIGHT SHOULDER PAIN: Primary | ICD-10-CM

## 2020-07-20 PROCEDURE — 97110 THERAPEUTIC EXERCISES: CPT

## 2020-07-20 PROCEDURE — 97140 MANUAL THERAPY 1/> REGIONS: CPT

## 2020-07-20 NOTE — PROGRESS NOTES
Physical Therapy Treatment Note     Name: Micaela Crespo [facility]  Clinic Number: [id number]    Therapy Diagnosis:   Encounter Diagnoses   Name Primary?    Chronic right shoulder pain Yes    Shoulder weakness      Physician: Doris Jerry MD    Visit Date: 7/20/2020  Physician Orders: PT Eval and Treat   Medical Diagnosis from Referral: M25.511,G89.29 (ICD-10-CM) - Chronic right shoulder pain  Evaluation Date: 7/6/2020  Authorization Period Expiration: 12/31/20  Plan of Care Expiration: 8/6/20  Visit # / Visits authorized: 3/ 20      Time In: 10:00  Time Out: 11:00  Total Billable Time: 39 minutes    Precautions: Standard    Subjective     Pt reports: that her neck is a whole lot better but still has pain in R elbow.  Pt. Still reports numbness in R forearm.    She was compliant with home exercise program.  Response to previous treatment: less pain  Functional change: more comfort at computer and sleeping    Neck Pain: 2/10  RShoulder Pain: 6/10  R elbow pain: 7/10          Objective   Micaela received therapeutic exercises to develop strength and endurance for 23 minutes including: Nu Step x 5 min.,  DNF x 2 min., prone chin tucks x 2 min., shoulder shrugs x 2 min., upper trapezius strengthening at wall x 2 min., lower trap standing x 2 min., middle trap standing x 2 min., scap retraction x 2 min., biceps curls x 2 min., triceps x 2 min.       Micaela received the following manual therapy techniques: Joint mobilizations, Manual traction and Soft tissue Mobilization were applied to the: for 16 minutes, including: manual cervical traction x 3 min.,  neural mobilization to R radial nerve x 6 min., R C4-5 opening mob x 2 min., R humeral ulnar distraction manip x 2 min., STM R cervical paraspinals x 3 min.      Micaela received hot pack for 10 minutes to R forearm and R cervical region.          Home Exercises Provided and Patient Education Provided     Education provided:   - Updated HEP    Written  Home Exercises Provided: yes.  Exercises were reviewed and Micaela was able to demonstrate them prior to the end of the session.  Micaela demonstrated good  understanding of the education provided.     See EMR under Patient Instructions for exercises provided 7/13/2020.    Assessment     Pt. Noted to have hypomobility with R C4-5.  Pt. Still has slight tension in R cervical paraspinals.  Pt. Has cont. R median and  R radial nerve bias.  Pt. Progressing will cont. Skilled PT to help reduce restrictions in cervical region and help desensitize nerves.  Discussed goal of PT to help patient get independent with pain management.  Will cont. 2 more visits unless increase in pain.  Pt. Educated on donning pillow to lumbar region during work at the computer to help improve posture.  Cont. POC.  Micaela is progressing well towards her goals.   Pt prognosis is Excellent.     Pt will continue to benefit from skilled outpatient physical therapy to address the deficits listed in the problem list box on initial evaluation, provide pt/family education and to maximize pt's level of independence in the home and community environment.     Pt's spiritual, cultural and educational needs considered and pt agreeable to plan of care and goals.     Anticipated barriers to physical therapy: none    Goals:  Short Term Goals: In 4 weeks   1.I with HEP  2. Pt. Increase DNF endurance to 30 sec. Hold to maintain proper posture at computer.  3.Patient to increase R shoulder girdle  strength 5/5 gross MMT.  4.Patient to have pain 0/10 R UE at all times.  5.Patient to score less than % impaired on the UEFS     Long Term Goals: In 8 weeks  1. Patient to increase cervical strength to 5/5 gross MMT to tolerate proper posture with sitting activities.  2. Patient to demo increase in UE strength to 5/5 gross to increase R shoulder stability to reach for and lift objects without discomfort.  3. Pt. To have neg neural bias in R upper limb to tolerate  typing at computer without discomfort.    Plan     Plan of care Certification: 7/6/2020 to 8/6/20     Outpatient Physical Therapy 2 times weekly for 6- 8 weeks to include the following interventions: Manual Therapy, Moist Heat/ Ice, Neuromuscular Re-ed, Patient Education, Self Care, Therapeutic Activites and Therapeutic Exercise, e-stim.        Thank you for this referral.     These services are reasonable and necessary for the conditions set forth above while under my care.  Laura Villela, PT

## 2021-03-31 ENCOUNTER — IMMUNIZATION (OUTPATIENT)
Dept: INTERNAL MEDICINE | Facility: CLINIC | Age: 41
End: 2021-03-31
Payer: COMMERCIAL

## 2021-03-31 DIAGNOSIS — Z23 NEED FOR VACCINATION: Primary | ICD-10-CM

## 2021-03-31 PROCEDURE — 91300 COVID-19, MRNA, LNP-S, PF, 30 MCG/0.3 ML DOSE VACCINE: ICD-10-PCS | Mod: S$GLB,,, | Performed by: FAMILY MEDICINE

## 2021-03-31 PROCEDURE — 0001A COVID-19, MRNA, LNP-S, PF, 30 MCG/0.3 ML DOSE VACCINE: CPT | Mod: CV19,S$GLB,, | Performed by: FAMILY MEDICINE

## 2021-03-31 PROCEDURE — 91300 COVID-19, MRNA, LNP-S, PF, 30 MCG/0.3 ML DOSE VACCINE: CPT | Mod: S$GLB,,, | Performed by: FAMILY MEDICINE

## 2021-03-31 PROCEDURE — 0001A COVID-19, MRNA, LNP-S, PF, 30 MCG/0.3 ML DOSE VACCINE: ICD-10-PCS | Mod: CV19,S$GLB,, | Performed by: FAMILY MEDICINE

## 2021-04-20 ENCOUNTER — PATIENT MESSAGE (OUTPATIENT)
Dept: OBSTETRICS AND GYNECOLOGY | Facility: CLINIC | Age: 41
End: 2021-04-20

## 2021-04-21 ENCOUNTER — IMMUNIZATION (OUTPATIENT)
Dept: INTERNAL MEDICINE | Facility: CLINIC | Age: 41
End: 2021-04-21
Payer: COMMERCIAL

## 2021-04-21 DIAGNOSIS — Z23 NEED FOR VACCINATION: Primary | ICD-10-CM

## 2021-04-21 PROCEDURE — 91300 COVID-19, MRNA, LNP-S, PF, 30 MCG/0.3 ML DOSE VACCINE: CPT | Mod: PBBFAC | Performed by: FAMILY MEDICINE

## 2021-04-21 PROCEDURE — 0002A COVID-19, MRNA, LNP-S, PF, 30 MCG/0.3 ML DOSE VACCINE: CPT | Mod: PBBFAC | Performed by: FAMILY MEDICINE

## 2021-05-06 ENCOUNTER — OFFICE VISIT (OUTPATIENT)
Dept: OBSTETRICS AND GYNECOLOGY | Facility: CLINIC | Age: 41
End: 2021-05-06
Payer: COMMERCIAL

## 2021-05-06 VITALS
BODY MASS INDEX: 32.37 KG/M2 | WEIGHT: 189.63 LBS | DIASTOLIC BLOOD PRESSURE: 80 MMHG | HEIGHT: 64 IN | SYSTOLIC BLOOD PRESSURE: 138 MMHG

## 2021-05-06 DIAGNOSIS — Z30.8 ENCOUNTER FOR OTHER CONTRACEPTIVE MANAGEMENT: Primary | ICD-10-CM

## 2021-05-06 PROCEDURE — 1126F PR PAIN SEVERITY QUANTIFIED, NO PAIN PRESENT: ICD-10-PCS | Mod: S$GLB,,, | Performed by: OBSTETRICS & GYNECOLOGY

## 2021-05-06 PROCEDURE — 99999 PR PBB SHADOW E&M-EST. PATIENT-LVL III: CPT | Mod: PBBFAC,,, | Performed by: OBSTETRICS & GYNECOLOGY

## 2021-05-06 PROCEDURE — 3008F BODY MASS INDEX DOCD: CPT | Mod: CPTII,S$GLB,, | Performed by: OBSTETRICS & GYNECOLOGY

## 2021-05-06 PROCEDURE — 99395 PR PREVENTIVE VISIT,EST,18-39: ICD-10-PCS | Mod: S$GLB,,, | Performed by: OBSTETRICS & GYNECOLOGY

## 2021-05-06 PROCEDURE — 1126F AMNT PAIN NOTED NONE PRSNT: CPT | Mod: S$GLB,,, | Performed by: OBSTETRICS & GYNECOLOGY

## 2021-05-06 PROCEDURE — 3008F PR BODY MASS INDEX (BMI) DOCUMENTED: ICD-10-PCS | Mod: CPTII,S$GLB,, | Performed by: OBSTETRICS & GYNECOLOGY

## 2021-05-06 PROCEDURE — 99999 PR PBB SHADOW E&M-EST. PATIENT-LVL III: ICD-10-PCS | Mod: PBBFAC,,, | Performed by: OBSTETRICS & GYNECOLOGY

## 2021-05-06 PROCEDURE — 99395 PREV VISIT EST AGE 18-39: CPT | Mod: S$GLB,,, | Performed by: OBSTETRICS & GYNECOLOGY

## 2021-05-06 RX ORDER — ACETAMINOPHEN AND CODEINE PHOSPHATE 120; 12 MG/5ML; MG/5ML
1 SOLUTION ORAL DAILY
Qty: 28 TABLET | Refills: 11 | Status: SHIPPED | OUTPATIENT
Start: 2021-05-06 | End: 2022-05-06

## 2021-05-31 ENCOUNTER — PATIENT MESSAGE (OUTPATIENT)
Dept: OBSTETRICS AND GYNECOLOGY | Facility: CLINIC | Age: 41
End: 2021-05-31

## 2021-06-14 ENCOUNTER — PATIENT MESSAGE (OUTPATIENT)
Dept: OBSTETRICS AND GYNECOLOGY | Facility: CLINIC | Age: 41
End: 2021-06-14

## 2021-07-08 ENCOUNTER — PATIENT MESSAGE (OUTPATIENT)
Dept: OBSTETRICS AND GYNECOLOGY | Facility: CLINIC | Age: 41
End: 2021-07-08

## 2021-07-28 ENCOUNTER — PROCEDURE VISIT (OUTPATIENT)
Dept: OBSTETRICS AND GYNECOLOGY | Facility: CLINIC | Age: 41
End: 2021-07-28
Payer: COMMERCIAL

## 2021-07-28 VITALS
HEIGHT: 64 IN | BODY MASS INDEX: 31.5 KG/M2 | DIASTOLIC BLOOD PRESSURE: 78 MMHG | WEIGHT: 184.5 LBS | SYSTOLIC BLOOD PRESSURE: 116 MMHG

## 2021-07-28 DIAGNOSIS — Z30.430 ENCOUNTER FOR IUD INSERTION: Primary | ICD-10-CM

## 2021-07-28 PROCEDURE — 81025 PR  URINE PREGNANCY TEST: ICD-10-PCS | Mod: S$GLB,,, | Performed by: OBSTETRICS & GYNECOLOGY

## 2021-07-28 PROCEDURE — 81025 URINE PREGNANCY TEST: CPT | Mod: S$GLB,,, | Performed by: OBSTETRICS & GYNECOLOGY

## 2021-07-28 PROCEDURE — 58300 INSERTION OF IUD: ICD-10-PCS | Mod: S$GLB,,, | Performed by: OBSTETRICS & GYNECOLOGY

## 2021-07-28 PROCEDURE — 58300 INSERT INTRAUTERINE DEVICE: CPT | Mod: S$GLB,,, | Performed by: OBSTETRICS & GYNECOLOGY

## 2024-06-24 ENCOUNTER — PATIENT MESSAGE (OUTPATIENT)
Dept: OBSTETRICS AND GYNECOLOGY | Facility: CLINIC | Age: 44
End: 2024-06-24
Payer: COMMERCIAL

## 2025-04-25 NOTE — TELEPHONE ENCOUNTER
Pt. States she has been traveling a lot and would need to schedule 3 hour test on same day as appt. With Dr. Borges. Scheduled appt. For pt.   [FreeTextEntry3] : I was present with the Resident during the key portions of the history and exam. I discussed the case with the Resident and agree with the findings and plan as documented in the Resident 's note. I have edited the note to reflect any changes in plan/assessment that I feel are appropriate.

## (undated) DEVICE — DRESSING COVER AQUACEL AG SURG

## (undated) DEVICE — TAPE MEDIPORE 3 X 10YD

## (undated) DEVICE — TAPE SILK 3IN

## (undated) DEVICE — PAD ABD 8X10 STERILE